# Patient Record
Sex: FEMALE | Race: WHITE | NOT HISPANIC OR LATINO | URBAN - METROPOLITAN AREA
[De-identification: names, ages, dates, MRNs, and addresses within clinical notes are randomized per-mention and may not be internally consistent; named-entity substitution may affect disease eponyms.]

---

## 2017-03-29 ENCOUNTER — EMERGENCY (EMERGENCY)
Facility: HOSPITAL | Age: 82
LOS: 1 days | Discharge: HOME | End: 2017-03-29
Attending: EMERGENCY MEDICINE
Payer: MEDICARE

## 2017-03-29 VITALS
WEIGHT: 128.09 LBS | RESPIRATION RATE: 18 BRPM | HEIGHT: 60 IN | DIASTOLIC BLOOD PRESSURE: 60 MMHG | OXYGEN SATURATION: 94 % | TEMPERATURE: 98 F | HEART RATE: 78 BPM | SYSTOLIC BLOOD PRESSURE: 120 MMHG

## 2017-03-29 VITALS
OXYGEN SATURATION: 95 % | DIASTOLIC BLOOD PRESSURE: 86 MMHG | TEMPERATURE: 99 F | SYSTOLIC BLOOD PRESSURE: 136 MMHG | HEART RATE: 99 BPM | RESPIRATION RATE: 18 BRPM

## 2017-03-29 DIAGNOSIS — K92.0 HEMATEMESIS: ICD-10-CM

## 2017-03-29 DIAGNOSIS — J44.9 CHRONIC OBSTRUCTIVE PULMONARY DISEASE, UNSPECIFIED: ICD-10-CM

## 2017-03-29 DIAGNOSIS — E78.5 HYPERLIPIDEMIA, UNSPECIFIED: ICD-10-CM

## 2017-03-29 DIAGNOSIS — K92.2 GASTROINTESTINAL HEMORRHAGE, UNSPECIFIED: ICD-10-CM

## 2017-03-29 DIAGNOSIS — E03.9 HYPOTHYROIDISM, UNSPECIFIED: ICD-10-CM

## 2017-03-29 LAB
ABO RH CONFIRMATION: SIGNIFICANT CHANGE UP
ALBUMIN SERPL ELPH-MCNC: 3.6 G/DL — SIGNIFICANT CHANGE UP (ref 3.3–5.2)
ALP SERPL-CCNC: 102 U/L — SIGNIFICANT CHANGE UP (ref 40–120)
ALT FLD-CCNC: 14 U/L — SIGNIFICANT CHANGE UP
ANION GAP SERPL CALC-SCNC: 13 MMOL/L — SIGNIFICANT CHANGE UP (ref 5–17)
APTT BLD: 27.9 SEC — SIGNIFICANT CHANGE UP (ref 27.5–37.4)
AST SERPL-CCNC: 17 U/L — SIGNIFICANT CHANGE UP
BASOPHILS # BLD AUTO: 0 K/UL — SIGNIFICANT CHANGE UP (ref 0–0.2)
BASOPHILS NFR BLD AUTO: 0.4 % — SIGNIFICANT CHANGE UP (ref 0–2)
BILIRUB SERPL-MCNC: 0.3 MG/DL — LOW (ref 0.4–2)
BLD GP AB SCN SERPL QL: SIGNIFICANT CHANGE UP
BUN SERPL-MCNC: 18 MG/DL — SIGNIFICANT CHANGE UP (ref 8–20)
CALCIUM SERPL-MCNC: 9.7 MG/DL — SIGNIFICANT CHANGE UP (ref 8.6–10.2)
CHLORIDE SERPL-SCNC: 98 MMOL/L — SIGNIFICANT CHANGE UP (ref 98–107)
CO2 SERPL-SCNC: 30 MMOL/L — HIGH (ref 22–29)
CREAT SERPL-MCNC: 0.7 MG/DL — SIGNIFICANT CHANGE UP (ref 0.5–1.3)
EOSINOPHIL # BLD AUTO: 0.2 K/UL — SIGNIFICANT CHANGE UP (ref 0–0.5)
EOSINOPHIL NFR BLD AUTO: 2.8 % — SIGNIFICANT CHANGE UP (ref 0–6)
GLUCOSE SERPL-MCNC: 96 MG/DL — SIGNIFICANT CHANGE UP (ref 70–115)
HCT VFR BLD CALC: 40 % — SIGNIFICANT CHANGE UP (ref 37–47)
HGB BLD-MCNC: 13.3 G/DL — SIGNIFICANT CHANGE UP (ref 12–16)
INR BLD: 1.01 RATIO — SIGNIFICANT CHANGE UP (ref 0.88–1.16)
LIDOCAIN IGE QN: 41 U/L — SIGNIFICANT CHANGE UP (ref 22–51)
LYMPHOCYTES # BLD AUTO: 1.7 K/UL — SIGNIFICANT CHANGE UP (ref 1–4.8)
LYMPHOCYTES # BLD AUTO: 24.4 % — SIGNIFICANT CHANGE UP (ref 20–55)
MAGNESIUM SERPL-MCNC: 2.2 MG/DL — SIGNIFICANT CHANGE UP (ref 1.8–2.5)
MCHC RBC-ENTMCNC: 30.4 PG — SIGNIFICANT CHANGE UP (ref 27–31)
MCHC RBC-ENTMCNC: 33.3 G/DL — SIGNIFICANT CHANGE UP (ref 32–36)
MCV RBC AUTO: 91.5 FL — SIGNIFICANT CHANGE UP (ref 81–99)
MONOCYTES # BLD AUTO: 0.7 K/UL — SIGNIFICANT CHANGE UP (ref 0–0.8)
MONOCYTES NFR BLD AUTO: 10.1 % — HIGH (ref 3–10)
NEUTROPHILS # BLD AUTO: 4.4 K/UL — SIGNIFICANT CHANGE UP (ref 1.8–8)
NEUTROPHILS NFR BLD AUTO: 62.2 % — SIGNIFICANT CHANGE UP (ref 37–73)
PHOSPHATE SERPL-MCNC: 3.2 MG/DL — SIGNIFICANT CHANGE UP (ref 2.4–4.7)
PLATELET # BLD AUTO: 313 K/UL — SIGNIFICANT CHANGE UP (ref 150–400)
POTASSIUM SERPL-MCNC: 4.1 MMOL/L — SIGNIFICANT CHANGE UP (ref 3.5–5.3)
POTASSIUM SERPL-SCNC: 4.1 MMOL/L — SIGNIFICANT CHANGE UP (ref 3.5–5.3)
PROT SERPL-MCNC: 7.3 G/DL — SIGNIFICANT CHANGE UP (ref 6.6–8.7)
PROTHROM AB SERPL-ACNC: 11.1 SEC — SIGNIFICANT CHANGE UP (ref 9.8–12.7)
RBC # BLD: 4.37 M/UL — LOW (ref 4.4–5.2)
RBC # FLD: 14 % — SIGNIFICANT CHANGE UP (ref 11–15.6)
SODIUM SERPL-SCNC: 141 MMOL/L — SIGNIFICANT CHANGE UP (ref 135–145)
TYPE + AB SCN PNL BLD: SIGNIFICANT CHANGE UP
WBC # BLD: 7 K/UL — SIGNIFICANT CHANGE UP (ref 4.8–10.8)
WBC # FLD AUTO: 7 K/UL — SIGNIFICANT CHANGE UP (ref 4.8–10.8)

## 2017-03-29 PROCEDURE — 83735 ASSAY OF MAGNESIUM: CPT

## 2017-03-29 PROCEDURE — 36415 COLL VENOUS BLD VENIPUNCTURE: CPT

## 2017-03-29 PROCEDURE — 85730 THROMBOPLASTIN TIME PARTIAL: CPT

## 2017-03-29 PROCEDURE — 85610 PROTHROMBIN TIME: CPT

## 2017-03-29 PROCEDURE — 99283 EMERGENCY DEPT VISIT LOW MDM: CPT

## 2017-03-29 PROCEDURE — 86901 BLOOD TYPING SEROLOGIC RH(D): CPT

## 2017-03-29 PROCEDURE — 83690 ASSAY OF LIPASE: CPT

## 2017-03-29 PROCEDURE — 86850 RBC ANTIBODY SCREEN: CPT

## 2017-03-29 PROCEDURE — 86900 BLOOD TYPING SEROLOGIC ABO: CPT

## 2017-03-29 PROCEDURE — 80053 COMPREHEN METABOLIC PANEL: CPT

## 2017-03-29 PROCEDURE — 84100 ASSAY OF PHOSPHORUS: CPT

## 2017-03-29 PROCEDURE — 85027 COMPLETE CBC AUTOMATED: CPT

## 2017-03-29 NOTE — ED ADULT NURSE NOTE - CHIEF COMPLAINT QUOTE
pt alert and awake, normal baseline as per ems, pt is sent from [facility] for r/o upper GI bleed, no signs of vomiting.

## 2017-03-29 NOTE — ED ADULT TRIAGE NOTE - CHIEF COMPLAINT QUOTE
pt alert and awake, normal baseline as per ems, pt is sent from [facility] for r/o upper GI bleed, no signs of vomiting.

## 2017-03-29 NOTE — ED ADULT NURSE NOTE - PMH
Anemia    COPD (chronic obstructive pulmonary disease)    Dementia    Hyperlipemia    Hypothyroidism (acquired)

## 2017-03-29 NOTE — ED ADULT NURSE NOTE - OBJECTIVE STATEMENT
89 year old female awake, confused sent from [facility] to r/o GI bleed. pt. poor historian. will continue to monitor. pending dispo.

## 2017-03-31 NOTE — ED PROVIDER NOTE - MEDICAL DECISION MAKING DETAILS
long talk with POA daughter and son at bedside they are in agreemtn with dementia of patient stable hemoglobin no active bleeding they are not going to pursue and admission forworkup because they never want her to get surgery they understand the risk beenfit of not pursuing where slight bleeding is coming from [facility] notified family all in agreemtn with plabn of care for d.c back to [facility]

## 2017-03-31 NOTE — ED PROVIDER NOTE - OBJECTIVE STATEMENT
pt found with blood next to herself at [facility] she has dementia and denies any fall or vomiting blood. she is at abselin per family at bedside. denies fever. denies HA or neck pain. no chest pain or sob. no abd pain. no n/v/d. no urinary f/u/d. no back pain. no motor or sensory deficits. denies drug use. no recent travel. no rash. no other acute issues symptoms or concerns

## 2017-05-13 ENCOUNTER — EMERGENCY (EMERGENCY)
Facility: HOSPITAL | Age: 82
LOS: 1 days | Discharge: HOME | End: 2017-05-13
Attending: EMERGENCY MEDICINE | Admitting: EMERGENCY MEDICINE
Payer: MEDICARE

## 2017-05-13 VITALS
OXYGEN SATURATION: 92 % | SYSTOLIC BLOOD PRESSURE: 112 MMHG | DIASTOLIC BLOOD PRESSURE: 62 MMHG | HEART RATE: 81 BPM | RESPIRATION RATE: 18 BRPM | TEMPERATURE: 98 F

## 2017-05-13 VITALS
WEIGHT: 130.07 LBS | SYSTOLIC BLOOD PRESSURE: 126 MMHG | RESPIRATION RATE: 18 BRPM | TEMPERATURE: 98 F | DIASTOLIC BLOOD PRESSURE: 80 MMHG | HEART RATE: 79 BPM | OXYGEN SATURATION: 94 % | HEIGHT: 60 IN

## 2017-05-13 DIAGNOSIS — E78.5 HYPERLIPIDEMIA, UNSPECIFIED: ICD-10-CM

## 2017-05-13 DIAGNOSIS — S01.91XA LACERATION WITHOUT FOREIGN BODY OF UNSPECIFIED PART OF HEAD, INITIAL ENCOUNTER: ICD-10-CM

## 2017-05-13 DIAGNOSIS — F03.90 UNSPECIFIED DEMENTIA, UNSPECIFIED SEVERITY, WITHOUT BEHAVIORAL DISTURBANCE, PSYCHOTIC DISTURBANCE, MOOD DISTURBANCE, AND ANXIETY: ICD-10-CM

## 2017-05-13 DIAGNOSIS — E03.9 HYPOTHYROIDISM, UNSPECIFIED: ICD-10-CM

## 2017-05-13 DIAGNOSIS — Z23 ENCOUNTER FOR IMMUNIZATION: ICD-10-CM

## 2017-05-13 DIAGNOSIS — J44.9 CHRONIC OBSTRUCTIVE PULMONARY DISEASE, UNSPECIFIED: ICD-10-CM
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
## 2017-05-13 DIAGNOSIS — S00.03XA CONTUSION OF SCALP, INITIAL ENCOUNTER: ICD-10-CM
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
## 2017-05-13 DIAGNOSIS — Z79.899 OTHER LONG TERM (CURRENT) DRUG THERAPY: ICD-10-CM

## 2017-05-13 LAB
ALBUMIN SERPL ELPH-MCNC: 3.5 G/DL — SIGNIFICANT CHANGE UP (ref 3.3–5.2)
ALP SERPL-CCNC: 109 U/L — SIGNIFICANT CHANGE UP (ref 40–120)
ALT FLD-CCNC: 11 U/L — SIGNIFICANT CHANGE UP
ANION GAP SERPL CALC-SCNC: 13 MMOL/L — SIGNIFICANT CHANGE UP (ref 5–17)
APTT BLD: 29 SEC — SIGNIFICANT CHANGE UP (ref 27.5–37.4)
AST SERPL-CCNC: 16 U/L — SIGNIFICANT CHANGE UP
BASOPHILS # BLD AUTO: 0 K/UL — SIGNIFICANT CHANGE UP (ref 0–0.2)
BASOPHILS NFR BLD AUTO: 0.6 % — SIGNIFICANT CHANGE UP (ref 0–2)
BILIRUB SERPL-MCNC: 0.3 MG/DL — LOW (ref 0.4–2)
BUN SERPL-MCNC: 14 MG/DL — SIGNIFICANT CHANGE UP (ref 8–20)
CALCIUM SERPL-MCNC: 9.2 MG/DL — SIGNIFICANT CHANGE UP (ref 8.6–10.2)
CHLORIDE SERPL-SCNC: 97 MMOL/L — LOW (ref 98–107)
CO2 SERPL-SCNC: 29 MMOL/L — SIGNIFICANT CHANGE UP (ref 22–29)
CREAT SERPL-MCNC: 0.77 MG/DL — SIGNIFICANT CHANGE UP (ref 0.5–1.3)
EOSINOPHIL # BLD AUTO: 0.2 K/UL — SIGNIFICANT CHANGE UP (ref 0–0.5)
EOSINOPHIL NFR BLD AUTO: 2.7 % — SIGNIFICANT CHANGE UP (ref 0–6)
GLUCOSE SERPL-MCNC: 89 MG/DL — SIGNIFICANT CHANGE UP (ref 70–115)
HCT VFR BLD CALC: 39.7 % — SIGNIFICANT CHANGE UP (ref 37–47)
HGB BLD-MCNC: 12.9 G/DL — SIGNIFICANT CHANGE UP (ref 12–16)
INR BLD: 1.02 RATIO — SIGNIFICANT CHANGE UP (ref 0.88–1.16)
LYMPHOCYTES # BLD AUTO: 1.6 K/UL — SIGNIFICANT CHANGE UP (ref 1–4.8)
LYMPHOCYTES # BLD AUTO: 19.8 % — LOW (ref 20–55)
MCHC RBC-ENTMCNC: 30.4 PG — SIGNIFICANT CHANGE UP (ref 27–31)
MCHC RBC-ENTMCNC: 32.5 G/DL — SIGNIFICANT CHANGE UP (ref 32–36)
MCV RBC AUTO: 93.4 FL — SIGNIFICANT CHANGE UP (ref 81–99)
MONOCYTES # BLD AUTO: 0.9 K/UL — HIGH (ref 0–0.8)
MONOCYTES NFR BLD AUTO: 10.9 % — HIGH (ref 3–10)
NEUTROPHILS # BLD AUTO: 5.2 K/UL — SIGNIFICANT CHANGE UP (ref 1.8–8)
NEUTROPHILS NFR BLD AUTO: 65.9 % — SIGNIFICANT CHANGE UP (ref 37–73)
NT-PROBNP SERPL-SCNC: 26 PG/ML — SIGNIFICANT CHANGE UP (ref 0–300)
PLATELET # BLD AUTO: 313 K/UL — SIGNIFICANT CHANGE UP (ref 150–400)
POTASSIUM SERPL-MCNC: 3.8 MMOL/L — SIGNIFICANT CHANGE UP (ref 3.5–5.3)
POTASSIUM SERPL-SCNC: 3.8 MMOL/L — SIGNIFICANT CHANGE UP (ref 3.5–5.3)
PROT SERPL-MCNC: 7.1 G/DL — SIGNIFICANT CHANGE UP (ref 6.6–8.7)
PROTHROM AB SERPL-ACNC: 11.2 SEC — SIGNIFICANT CHANGE UP (ref 9.8–12.7)
RBC # BLD: 4.25 M/UL — LOW (ref 4.4–5.2)
RBC # FLD: 13.9 % — SIGNIFICANT CHANGE UP (ref 11–15.6)
SODIUM SERPL-SCNC: 139 MMOL/L — SIGNIFICANT CHANGE UP (ref 135–145)
TROPONIN T SERPL-MCNC: <0.01 NG/ML — SIGNIFICANT CHANGE UP (ref 0–0.06)
WBC # BLD: 7.9 K/UL — SIGNIFICANT CHANGE UP (ref 4.8–10.8)
WBC # FLD AUTO: 7.9 K/UL — SIGNIFICANT CHANGE UP (ref 4.8–10.8)

## 2017-05-13 PROCEDURE — 90715 TDAP VACCINE 7 YRS/> IM: CPT

## 2017-05-13 PROCEDURE — 83880 ASSAY OF NATRIURETIC PEPTIDE: CPT

## 2017-05-13 PROCEDURE — 85027 COMPLETE CBC AUTOMATED: CPT

## 2017-05-13 PROCEDURE — 85730 THROMBOPLASTIN TIME PARTIAL: CPT

## 2017-05-13 PROCEDURE — 99284 EMERGENCY DEPT VISIT MOD MDM: CPT

## 2017-05-13 PROCEDURE — 90471 IMMUNIZATION ADMIN: CPT

## 2017-05-13 PROCEDURE — 70450 CT HEAD/BRAIN W/O DYE: CPT

## 2017-05-13 PROCEDURE — 70450 CT HEAD/BRAIN W/O DYE: CPT | Mod: 26

## 2017-05-13 PROCEDURE — 84484 ASSAY OF TROPONIN QUANT: CPT

## 2017-05-13 PROCEDURE — 99284 EMERGENCY DEPT VISIT MOD MDM: CPT | Mod: 25

## 2017-05-13 PROCEDURE — 85610 PROTHROMBIN TIME: CPT

## 2017-05-13 PROCEDURE — 80053 COMPREHEN METABOLIC PANEL: CPT

## 2017-05-13 RX ORDER — TETANUS TOXOID, REDUCED DIPHTHERIA TOXOID AND ACELLULAR PERTUSSIS VACCINE, ADSORBED 5; 2.5; 8; 8; 2.5 [IU]/.5ML; [IU]/.5ML; UG/.5ML; UG/.5ML; UG/.5ML
0.5 SUSPENSION INTRAMUSCULAR ONCE
Qty: 0 | Refills: 0 | Status: COMPLETED | OUTPATIENT
Start: 2017-05-13 | End: 2017-05-13

## 2017-05-13 RX ADMIN — TETANUS TOXOID, REDUCED DIPHTHERIA TOXOID AND ACELLULAR PERTUSSIS VACCINE, ADSORBED 0.5 MILLILITER(S): 5; 2.5; 8; 8; 2.5 SUSPENSION INTRAMUSCULAR at 12:31

## 2017-05-13 NOTE — ED ADULT TRIAGE NOTE - CHIEF COMPLAINT QUOTE
pt alert and awake, normal baseline as per ems, BIBA from [facility] from unwitnessed fall, abrasion behind right ear with dry blood, denies blood thinner use.

## 2017-05-13 NOTE — ED PROVIDER NOTE - HEAD, MLM
Small occipital hematoma with lac seen. No active bleeding,  Head shape is symmetrical. Small occipital hematoma with abrasion seen. No active bleeding,  Head shape is symmetrical.

## 2017-05-13 NOTE — ED PROVIDER NOTE - MEDICAL DECISION MAKING DETAILS
The patient appears well and feels better and wishes to go back to [facility]. Lab and CT negative. Will DC and follow up with PMD

## 2017-05-13 NOTE — ED ADULT NURSE NOTE - OBJECTIVE STATEMENT
89 yo female found by staff on floor with dried blood noted to right occipital area behind the right ear.  Patient poor historian due to baseline dementia.  Abrasion noted. Denies any complaints.

## 2017-05-13 NOTE — ED PROVIDER NOTE - OBJECTIVE STATEMENT
The patient is a 89 year old female presents with un-witnessed fall. There is small laceration on the behind the right ear.  The patient has history of dementia.

## 2017-05-13 NOTE — ED PROVIDER NOTE - SKIN NEGATIVE STATEMENT, MLM
no jaundice, no lesions, no pruritis, and no rashes, small laceration seen on occiput behind right ear

## 2017-07-12 ENCOUNTER — EMERGENCY (EMERGENCY)
Facility: HOSPITAL | Age: 82
LOS: 1 days | Discharge: HOME | End: 2017-07-12
Attending: EMERGENCY MEDICINE
Payer: MEDICARE

## 2017-07-12 VITALS
OXYGEN SATURATION: 94 % | RESPIRATION RATE: 20 BRPM | DIASTOLIC BLOOD PRESSURE: 87 MMHG | SYSTOLIC BLOOD PRESSURE: 148 MMHG | TEMPERATURE: 98 F | HEART RATE: 78 BPM

## 2017-07-12 VITALS
HEART RATE: 88 BPM | TEMPERATURE: 98 F | DIASTOLIC BLOOD PRESSURE: 74 MMHG | HEIGHT: 65 IN | WEIGHT: 149.91 LBS | OXYGEN SATURATION: 99 % | SYSTOLIC BLOOD PRESSURE: 128 MMHG | RESPIRATION RATE: 16 BRPM

## 2017-07-12 DIAGNOSIS — R41.82 ALTERED MENTAL STATUS, UNSPECIFIED: ICD-10-CM

## 2017-07-12 DIAGNOSIS — Z79.899 OTHER LONG TERM (CURRENT) DRUG THERAPY: ICD-10-CM

## 2017-07-12 DIAGNOSIS — N39.0 URINARY TRACT INFECTION, SITE NOT SPECIFIED: ICD-10-CM

## 2017-07-12 DIAGNOSIS — E03.9 HYPOTHYROIDISM, UNSPECIFIED: ICD-10-CM

## 2017-07-12 DIAGNOSIS — E78.5 HYPERLIPIDEMIA, UNSPECIFIED: ICD-10-CM

## 2017-07-12 DIAGNOSIS — J44.9 CHRONIC OBSTRUCTIVE PULMONARY DISEASE, UNSPECIFIED: ICD-10-CM

## 2017-07-12 LAB
ALBUMIN SERPL ELPH-MCNC: 3.9 G/DL — SIGNIFICANT CHANGE UP (ref 3.3–5.2)
ALP SERPL-CCNC: 115 U/L — SIGNIFICANT CHANGE UP (ref 40–120)
ALT FLD-CCNC: 14 U/L — SIGNIFICANT CHANGE UP
ANION GAP SERPL CALC-SCNC: 15 MMOL/L — SIGNIFICANT CHANGE UP (ref 5–17)
APAP SERPL-MCNC: <7.5 UG/ML — LOW (ref 10–26)
APPEARANCE UR: CLEAR — SIGNIFICANT CHANGE UP
APTT BLD: 26.2 SEC — LOW (ref 27.5–37.4)
AST SERPL-CCNC: 25 U/L — SIGNIFICANT CHANGE UP
BASOPHILS # BLD AUTO: 0 K/UL — SIGNIFICANT CHANGE UP (ref 0–0.2)
BASOPHILS NFR BLD AUTO: 0.7 % — SIGNIFICANT CHANGE UP (ref 0–2)
BILIRUB SERPL-MCNC: 0.4 MG/DL — SIGNIFICANT CHANGE UP (ref 0.4–2)
BILIRUB UR-MCNC: NEGATIVE — SIGNIFICANT CHANGE UP
BUN SERPL-MCNC: 13 MG/DL — SIGNIFICANT CHANGE UP (ref 8–20)
CALCIUM SERPL-MCNC: 9.3 MG/DL — SIGNIFICANT CHANGE UP (ref 8.6–10.2)
CHLORIDE SERPL-SCNC: 102 MMOL/L — SIGNIFICANT CHANGE UP (ref 98–107)
CO2 SERPL-SCNC: 26 MMOL/L — SIGNIFICANT CHANGE UP (ref 22–29)
COLOR SPEC: YELLOW — SIGNIFICANT CHANGE UP
CREAT SERPL-MCNC: 0.73 MG/DL — SIGNIFICANT CHANGE UP (ref 0.5–1.3)
DIFF PNL FLD: NEGATIVE — SIGNIFICANT CHANGE UP
EOSINOPHIL # BLD AUTO: 0.2 K/UL — SIGNIFICANT CHANGE UP (ref 0–0.5)
EOSINOPHIL NFR BLD AUTO: 2.9 % — SIGNIFICANT CHANGE UP (ref 0–6)
EPI CELLS # UR: SIGNIFICANT CHANGE UP
GLUCOSE SERPL-MCNC: 92 MG/DL — SIGNIFICANT CHANGE UP (ref 70–115)
GLUCOSE UR QL: NEGATIVE MG/DL — SIGNIFICANT CHANGE UP
HCT VFR BLD CALC: 42.5 % — SIGNIFICANT CHANGE UP (ref 37–47)
HGB BLD-MCNC: 14.1 G/DL — SIGNIFICANT CHANGE UP (ref 12–16)
INR BLD: 0.98 RATIO — SIGNIFICANT CHANGE UP (ref 0.88–1.16)
KETONES UR-MCNC: NEGATIVE — SIGNIFICANT CHANGE UP
LEUKOCYTE ESTERASE UR-ACNC: ABNORMAL
LYMPHOCYTES # BLD AUTO: 1.8 K/UL — SIGNIFICANT CHANGE UP (ref 1–4.8)
LYMPHOCYTES # BLD AUTO: 25.7 % — SIGNIFICANT CHANGE UP (ref 20–55)
MCHC RBC-ENTMCNC: 30.5 PG — SIGNIFICANT CHANGE UP (ref 27–31)
MCHC RBC-ENTMCNC: 33.2 G/DL — SIGNIFICANT CHANGE UP (ref 32–36)
MCV RBC AUTO: 92 FL — SIGNIFICANT CHANGE UP (ref 81–99)
MONOCYTES # BLD AUTO: 0.6 K/UL — SIGNIFICANT CHANGE UP (ref 0–0.8)
MONOCYTES NFR BLD AUTO: 9 % — SIGNIFICANT CHANGE UP (ref 3–10)
NEUTROPHILS # BLD AUTO: 4.3 K/UL — SIGNIFICANT CHANGE UP (ref 1.8–8)
NEUTROPHILS NFR BLD AUTO: 61.6 % — SIGNIFICANT CHANGE UP (ref 37–73)
NITRITE UR-MCNC: NEGATIVE — SIGNIFICANT CHANGE UP
PH UR: 8 — SIGNIFICANT CHANGE UP (ref 5–8)
PLATELET # BLD AUTO: 311 K/UL — SIGNIFICANT CHANGE UP (ref 150–400)
POTASSIUM SERPL-MCNC: 4.8 MMOL/L — SIGNIFICANT CHANGE UP (ref 3.5–5.3)
POTASSIUM SERPL-SCNC: 4.8 MMOL/L — SIGNIFICANT CHANGE UP (ref 3.5–5.3)
PROT SERPL-MCNC: 7.6 G/DL — SIGNIFICANT CHANGE UP (ref 6.6–8.7)
PROT UR-MCNC: NEGATIVE MG/DL — SIGNIFICANT CHANGE UP
PROTHROM AB SERPL-ACNC: 10.8 SEC — SIGNIFICANT CHANGE UP (ref 9.8–12.7)
RBC # BLD: 4.62 M/UL — SIGNIFICANT CHANGE UP (ref 4.4–5.2)
RBC # FLD: 13.4 % — SIGNIFICANT CHANGE UP (ref 11–15.6)
SODIUM SERPL-SCNC: 143 MMOL/L — SIGNIFICANT CHANGE UP (ref 135–145)
SP GR SPEC: 1.01 — SIGNIFICANT CHANGE UP (ref 1.01–1.02)
TSH SERPL-MCNC: 0.02 UIU/ML — LOW (ref 0.27–4.2)
UROBILINOGEN FLD QL: NEGATIVE MG/DL — SIGNIFICANT CHANGE UP
WBC # BLD: 7 K/UL — SIGNIFICANT CHANGE UP (ref 4.8–10.8)
WBC # FLD AUTO: 7 K/UL — SIGNIFICANT CHANGE UP (ref 4.8–10.8)
WBC UR QL: SIGNIFICANT CHANGE UP

## 2017-07-12 PROCEDURE — 80307 DRUG TEST PRSMV CHEM ANLYZR: CPT

## 2017-07-12 PROCEDURE — 70450 CT HEAD/BRAIN W/O DYE: CPT | Mod: 26

## 2017-07-12 PROCEDURE — 81001 URINALYSIS AUTO W/SCOPE: CPT

## 2017-07-12 PROCEDURE — 93005 ELECTROCARDIOGRAM TRACING: CPT

## 2017-07-12 PROCEDURE — 87086 URINE CULTURE/COLONY COUNT: CPT

## 2017-07-12 PROCEDURE — 36415 COLL VENOUS BLD VENIPUNCTURE: CPT

## 2017-07-12 PROCEDURE — 70450 CT HEAD/BRAIN W/O DYE: CPT

## 2017-07-12 PROCEDURE — 93010 ELECTROCARDIOGRAM REPORT: CPT

## 2017-07-12 PROCEDURE — 71046 X-RAY EXAM CHEST 2 VIEWS: CPT

## 2017-07-12 PROCEDURE — 71020: CPT | Mod: 26

## 2017-07-12 PROCEDURE — 84443 ASSAY THYROID STIM HORMONE: CPT

## 2017-07-12 PROCEDURE — 85027 COMPLETE CBC AUTOMATED: CPT

## 2017-07-12 PROCEDURE — 85730 THROMBOPLASTIN TIME PARTIAL: CPT

## 2017-07-12 PROCEDURE — 80053 COMPREHEN METABOLIC PANEL: CPT

## 2017-07-12 PROCEDURE — 99284 EMERGENCY DEPT VISIT MOD MDM: CPT

## 2017-07-12 PROCEDURE — 85610 PROTHROMBIN TIME: CPT

## 2017-07-12 PROCEDURE — 99284 EMERGENCY DEPT VISIT MOD MDM: CPT | Mod: 25

## 2017-07-12 RX ORDER — CEPHALEXIN 500 MG
1 CAPSULE ORAL
Qty: 40 | Refills: 0 | OUTPATIENT
Start: 2017-07-12 | End: 2017-07-22

## 2017-07-12 RX ORDER — CEPHALEXIN 500 MG
500 CAPSULE ORAL
Qty: 0 | Refills: 0 | Status: DISCONTINUED | OUTPATIENT
Start: 2017-07-12 | End: 2017-07-16

## 2017-07-12 RX ADMIN — Medication 500 MILLIGRAM(S): at 13:13

## 2017-07-12 NOTE — ED PROVIDER NOTE - OBJECTIVE STATEMENT
89 year old female with a h/o dementia , COPD, HLD and anemia presents with altered mental status. Pt currently lives in the [facility] and was noted today to be very difficult to arouse. pt is currently yelling at her son to go home

## 2017-07-12 NOTE — ED PROVIDER NOTE - PROGRESS NOTE DETAILS
pt's daughter arrived and ststes that this is her baseline mental status. pt is awake alert and fiesty with a uti to be given antibiotics and discharged

## 2017-07-12 NOTE — ED ADULT TRIAGE NOTE - CHIEF COMPLAINT QUOTE
Sent from [facility] for "unresponsive to verbal and tactile stimuli." EMS states NH staff stated patient took longer than normal to open eyes this morning. Pt with HX dementia. Pt states she does not know why she is here. Pt well appearing without any distress present. Calm and cooperative. Respirations even and unlabored. Pt disoriented to place and time.

## 2017-07-12 NOTE — ED ADULT NURSE NOTE - OBJECTIVE STATEMENT
Sent from [facility] for "unresponsive to verbal and tactile stimuli." EMS states NH staff stated patient took longer than normal to open eyes this morning. Pt with HX dementia. Pt states she does not know why she is here. Pt well appearing without any distress present. irritated yet cooperative, but requesting to "go home" frequently . Respirations even and unlabored. Pt disoriented to place and time son was at bedside but left due to patients irritation with his presence.

## 2017-07-13 LAB
CULTURE RESULTS: SIGNIFICANT CHANGE UP
SPECIMEN SOURCE: SIGNIFICANT CHANGE UP

## 2018-01-22 ENCOUNTER — INPATIENT (INPATIENT)
Facility: HOSPITAL | Age: 83
LOS: 2 days | Discharge: NURSING, REHAB OR LONG-TERM CARE | DRG: 812 | End: 2018-01-25
Attending: HOSPITALIST | Admitting: HOSPITALIST
Payer: MEDICARE

## 2018-01-22 VITALS
WEIGHT: 100.09 LBS | TEMPERATURE: 98 F | OXYGEN SATURATION: 92 % | HEART RATE: 72 BPM | SYSTOLIC BLOOD PRESSURE: 99 MMHG | DIASTOLIC BLOOD PRESSURE: 57 MMHG | HEIGHT: 56 IN | RESPIRATION RATE: 20 BRPM

## 2018-01-22 DIAGNOSIS — D64.9 ANEMIA, UNSPECIFIED: ICD-10-CM

## 2018-01-22 LAB
ALBUMIN SERPL ELPH-MCNC: 3.5 G/DL — SIGNIFICANT CHANGE UP (ref 3.3–5.2)
ALP SERPL-CCNC: 117 U/L — SIGNIFICANT CHANGE UP (ref 40–120)
ALT FLD-CCNC: 11 U/L — SIGNIFICANT CHANGE UP
ANION GAP SERPL CALC-SCNC: 13 MMOL/L — SIGNIFICANT CHANGE UP (ref 5–17)
ANISOCYTOSIS BLD QL: SLIGHT — SIGNIFICANT CHANGE UP
APTT BLD: 23.4 SEC — LOW (ref 27.5–37.4)
AST SERPL-CCNC: 20 U/L — SIGNIFICANT CHANGE UP
BASOPHILS # BLD AUTO: 0 K/UL — SIGNIFICANT CHANGE UP (ref 0–0.2)
BASOPHILS NFR BLD AUTO: 0.8 % — SIGNIFICANT CHANGE UP (ref 0–2)
BILIRUB SERPL-MCNC: 0.4 MG/DL — SIGNIFICANT CHANGE UP (ref 0.4–2)
BLD GP AB SCN SERPL QL: SIGNIFICANT CHANGE UP
BUN SERPL-MCNC: 18 MG/DL — SIGNIFICANT CHANGE UP (ref 8–20)
CALCIUM SERPL-MCNC: 9 MG/DL — SIGNIFICANT CHANGE UP (ref 8.6–10.2)
CHLORIDE SERPL-SCNC: 96 MMOL/L — LOW (ref 98–107)
CK SERPL-CCNC: 43 U/L — SIGNIFICANT CHANGE UP (ref 25–170)
CO2 SERPL-SCNC: 32 MMOL/L — HIGH (ref 22–29)
CREAT SERPL-MCNC: 0.9 MG/DL — SIGNIFICANT CHANGE UP (ref 0.5–1.3)
ELLIPTOCYTES BLD QL SMEAR: SLIGHT — SIGNIFICANT CHANGE UP
EOSINOPHIL # BLD AUTO: 0.1 K/UL — SIGNIFICANT CHANGE UP (ref 0–0.5)
EOSINOPHIL NFR BLD AUTO: 1.6 % — SIGNIFICANT CHANGE UP (ref 0–6)
FERRITIN SERPL-MCNC: 11.6 NG/ML — SIGNIFICANT CHANGE UP (ref 11–306)
FOLATE SERPL-MCNC: 18.9 NG/ML — HIGH (ref 4–16)
GLUCOSE SERPL-MCNC: 139 MG/DL — HIGH (ref 70–115)
HCT VFR BLD CALC: 24 % — LOW (ref 37–47)
HGB BLD-MCNC: 6.8 G/DL — CRITICAL LOW (ref 12–16)
HYPOCHROMIA BLD QL: SLIGHT — SIGNIFICANT CHANGE UP
INR BLD: 1.1 RATIO — SIGNIFICANT CHANGE UP (ref 0.88–1.16)
IRON SATN MFR SERPL: 10 UG/DL — LOW (ref 37–145)
IRON SATN MFR SERPL: 3 % — LOW (ref 14–50)
LYMPHOCYTES # BLD AUTO: 0.7 K/UL — LOW (ref 1–4.8)
LYMPHOCYTES # BLD AUTO: 13.1 % — LOW (ref 20–55)
MCHC RBC-ENTMCNC: 21.6 PG — LOW (ref 27–31)
MCHC RBC-ENTMCNC: 28.3 G/DL — LOW (ref 32–36)
MCV RBC AUTO: 76.2 FL — LOW (ref 81–99)
MICROCYTES BLD QL: SLIGHT — SIGNIFICANT CHANGE UP
MONOCYTES # BLD AUTO: 0.6 K/UL — SIGNIFICANT CHANGE UP (ref 0–0.8)
MONOCYTES NFR BLD AUTO: 11.5 % — HIGH (ref 3–10)
NEUTROPHILS # BLD AUTO: 3.7 K/UL — SIGNIFICANT CHANGE UP (ref 1.8–8)
NEUTROPHILS NFR BLD AUTO: 72.8 % — SIGNIFICANT CHANGE UP (ref 37–73)
OB PNL STL: NEGATIVE — SIGNIFICANT CHANGE UP
OVALOCYTES BLD QL SMEAR: SLIGHT — SIGNIFICANT CHANGE UP
PLAT MORPH BLD: NORMAL — SIGNIFICANT CHANGE UP
PLATELET # BLD AUTO: 494 K/UL — HIGH (ref 150–400)
POIKILOCYTOSIS BLD QL AUTO: SLIGHT — SIGNIFICANT CHANGE UP
POTASSIUM SERPL-MCNC: 2.7 MMOL/L — CRITICAL LOW (ref 3.5–5.3)
POTASSIUM SERPL-SCNC: 2.7 MMOL/L — CRITICAL LOW (ref 3.5–5.3)
PROT SERPL-MCNC: 6.7 G/DL — SIGNIFICANT CHANGE UP (ref 6.6–8.7)
PROTHROM AB SERPL-ACNC: 12.1 SEC — SIGNIFICANT CHANGE UP (ref 9.8–12.7)
RBC # BLD: 2.94 M/UL — LOW (ref 4.4–5.2)
RBC # BLD: 3.15 M/UL — LOW (ref 4.4–5.2)
RBC # FLD: 17.2 % — HIGH (ref 11–15.6)
RBC BLD AUTO: ABNORMAL
RETICS #: 4.4 K/UL — LOW (ref 25–125)
RETICS/RBC NFR: 1.5 % — SIGNIFICANT CHANGE UP (ref 0.5–2.5)
SODIUM SERPL-SCNC: 141 MMOL/L — SIGNIFICANT CHANGE UP (ref 135–145)
TARGETS BLD QL SMEAR: SLIGHT — SIGNIFICANT CHANGE UP
TIBC SERPL-MCNC: 355 UG/DL — SIGNIFICANT CHANGE UP (ref 220–430)
TRANSFERRIN SERPL-MCNC: 248 MG/DL — SIGNIFICANT CHANGE UP (ref 192–382)
TROPONIN T SERPL-MCNC: <0.01 NG/ML — SIGNIFICANT CHANGE UP (ref 0–0.06)
TYPE + AB SCN PNL BLD: SIGNIFICANT CHANGE UP
VIT B12 SERPL-MCNC: 1043 PG/ML — HIGH (ref 180–914)
WBC # BLD: 5 K/UL — SIGNIFICANT CHANGE UP (ref 4.8–10.8)
WBC # FLD AUTO: 5 K/UL — SIGNIFICANT CHANGE UP (ref 4.8–10.8)

## 2018-01-22 PROCEDURE — 99285 EMERGENCY DEPT VISIT HI MDM: CPT

## 2018-01-22 PROCEDURE — 99223 1ST HOSP IP/OBS HIGH 75: CPT | Mod: AI

## 2018-01-22 PROCEDURE — 93010 ELECTROCARDIOGRAM REPORT: CPT

## 2018-01-22 PROCEDURE — 70450 CT HEAD/BRAIN W/O DYE: CPT | Mod: 26

## 2018-01-22 RX ORDER — CITALOPRAM 10 MG/1
10 TABLET, FILM COATED ORAL DAILY
Qty: 0 | Refills: 0 | Status: DISCONTINUED | OUTPATIENT
Start: 2018-01-22 | End: 2018-01-25

## 2018-01-22 RX ORDER — PANTOPRAZOLE SODIUM 20 MG/1
40 TABLET, DELAYED RELEASE ORAL
Qty: 0 | Refills: 0 | Status: DISCONTINUED | OUTPATIENT
Start: 2018-01-22 | End: 2018-01-25

## 2018-01-22 RX ORDER — POTASSIUM CHLORIDE 20 MEQ
10 PACKET (EA) ORAL
Qty: 0 | Refills: 0 | Status: COMPLETED | OUTPATIENT
Start: 2018-01-22 | End: 2018-01-23

## 2018-01-22 RX ORDER — POTASSIUM CHLORIDE 20 MEQ
40 PACKET (EA) ORAL ONCE
Qty: 0 | Refills: 0 | Status: COMPLETED | OUTPATIENT
Start: 2018-01-22 | End: 2018-01-22

## 2018-01-22 RX ORDER — DONEPEZIL HYDROCHLORIDE 10 MG/1
10 TABLET, FILM COATED ORAL AT BEDTIME
Qty: 0 | Refills: 0 | Status: DISCONTINUED | OUTPATIENT
Start: 2018-01-22 | End: 2018-01-25

## 2018-01-22 RX ORDER — POTASSIUM CHLORIDE 20 MEQ
10 PACKET (EA) ORAL
Qty: 0 | Refills: 0 | Status: DISCONTINUED | OUTPATIENT
Start: 2018-01-22 | End: 2018-01-22

## 2018-01-22 RX ORDER — HALOPERIDOL DECANOATE 100 MG/ML
1 INJECTION INTRAMUSCULAR EVERY 8 HOURS
Qty: 0 | Refills: 0 | Status: DISCONTINUED | OUTPATIENT
Start: 2018-01-22 | End: 2018-01-24

## 2018-01-22 RX ORDER — POTASSIUM CHLORIDE 20 MEQ
20 PACKET (EA) ORAL DAILY
Qty: 0 | Refills: 0 | Status: DISCONTINUED | OUTPATIENT
Start: 2018-01-23 | End: 2018-01-25

## 2018-01-22 RX ORDER — SODIUM CHLORIDE 9 MG/ML
1000 INJECTION INTRAMUSCULAR; INTRAVENOUS; SUBCUTANEOUS
Qty: 0 | Refills: 0 | Status: DISCONTINUED | OUTPATIENT
Start: 2018-01-22 | End: 2018-01-24

## 2018-01-22 RX ORDER — RISPERIDONE 4 MG/1
0.12 TABLET ORAL AT BEDTIME
Qty: 0 | Refills: 0 | Status: DISCONTINUED | OUTPATIENT
Start: 2018-01-22 | End: 2018-01-25

## 2018-01-22 RX ORDER — LEVOTHYROXINE SODIUM 125 MCG
125 TABLET ORAL DAILY
Qty: 0 | Refills: 0 | Status: DISCONTINUED | OUTPATIENT
Start: 2018-01-22 | End: 2018-01-25

## 2018-01-22 RX ORDER — CALCIUM CARBONATE 500(1250)
1 TABLET ORAL DAILY
Qty: 0 | Refills: 0 | Status: DISCONTINUED | OUTPATIENT
Start: 2018-01-22 | End: 2018-01-25

## 2018-01-22 RX ADMIN — HALOPERIDOL DECANOATE 1 MILLIGRAM(S): 100 INJECTION INTRAMUSCULAR at 17:33

## 2018-01-22 RX ADMIN — Medication 100 MILLIEQUIVALENT(S): at 20:07

## 2018-01-22 RX ADMIN — Medication 100 MILLIEQUIVALENT(S): at 11:25

## 2018-01-22 RX ADMIN — RISPERIDONE 0.12 MILLIGRAM(S): 4 TABLET ORAL at 22:49

## 2018-01-22 RX ADMIN — Medication 100 MILLIEQUIVALENT(S): at 23:02

## 2018-01-22 RX ADMIN — Medication 100 MILLIEQUIVALENT(S): at 21:54

## 2018-01-22 RX ADMIN — SODIUM CHLORIDE 75 MILLILITER(S): 9 INJECTION INTRAMUSCULAR; INTRAVENOUS; SUBCUTANEOUS at 11:30

## 2018-01-22 RX ADMIN — DONEPEZIL HYDROCHLORIDE 10 MILLIGRAM(S): 10 TABLET, FILM COATED ORAL at 22:49

## 2018-01-22 RX ADMIN — Medication 40 MILLIEQUIVALENT(S): at 14:57

## 2018-01-22 NOTE — ED ADULT NURSE REASSESSMENT NOTE - NS ED NURSE REASSESS COMMENT FT1
Care assumed from off-going RN at this time, charting as noted. Pt is not on CM or  at this time but is on a 1:1 with second PRBC unit infusing, will locate monitor for patient. Second IV to be obtained at this time for pt to receive K+ riders.
Dr Hall admitting MD at bedside
Gilford from hematology made writer aware that PT/PTT canceled because specimen was of "questionable quality". Gilford made RN aware that because hemoglobin is significantly lower than last visit to ED labs need to be redrawn.
LAb will come to redraw PT/INR and PTT
PT completed first IV K+ tolerated well, first unit of PRBCs up and infusing at this time, no incidents, pt having no adverse reaction at this time. Pt has one to one in place and son remains at bedside.
Pt became increasingly agitated, very combative and physically abusive, pt attempting to climb out of stretcher. Attempted to re-orient pt back to stretcher with one to one.  Security also at bedside pt was screaming in ED, Pt was repositioned in bed. Adjeny BIGGS  notified and telephone order obtained for Haldol 1mg IVP Q8hrs prn agitation.
Pt medicated with Haldol as per MD orders
Pt sitting up in stretcher, drinking apple juice, tolerating well.
Spoke with Gem at [facility], pt was sent in for syncope on the toilet. Pt was ambulated to the bathroom with aid and pt had a syncopal episode while on  the toilet witnessed by aid, they called 911 and sent pt here for evaluation.
Lab at bedside to recollect blood

## 2018-01-22 NOTE — H&P ADULT - NSHPLABSRESULTS_GEN_ALL_CORE
LABS:                        6.8    5.0   )-----------( 494      ( 22 Jan 2018 07:46 )             24.0     01-22    141  |  96<L>  |  18.0  ----------------------------<  139<H>  2.7<LL>   |  32.0<H>  |  0.90    Ca    9.0      22 Jan 2018 06:37    TPro  6.7  /  Alb  3.5  /  TBili  0.4  /  DBili  x   /  AST  20  /  ALT  11  /  AlkPhos  117  01-22    PT/INR - ( 22 Jan 2018 08:20 )   PT: 12.1 sec;   INR: 1.10 ratio         PTT - ( 22 Jan 2018 08:20 )  PTT:23.4 sec    LIVER FUNCTIONS - ( 22 Jan 2018 06:37 )  Alb: 3.5 g/dL / Pro: 6.7 g/dL / ALK PHOS: 117 U/L / ALT: 11 U/L / AST: 20 U/L / GGT: x               CARDIAC MARKERS ( 22 Jan 2018 06:37 )  x     / <0.01 ng/mL / 43 U/L / x     / x

## 2018-01-22 NOTE — ED ADULT NURSE NOTE - OBJECTIVE STATEMENT
pt awake and alert, baseline mental status per NH sent to ED s/p witnessed syncopal episode while standing up with staff. staff reports "few seconds of LOC." staff denies pt falling or hitting head. pt denies any pain or discomfort. hx of dementia. pt states she doesn't know why she was sent to ED but she feels fine. pt denies headache, dizziness, lightheadedness, chest pain, SOB, recent fever or chills. pt lying comfortably, will continue to monitor.

## 2018-01-22 NOTE — ED PROVIDER NOTE - OBJECTIVE STATEMENT
89 yo F presents to ED via EMS s/p possible syncopal episode after waking up this AM. Pt with hx of dementia and only oriented x 1.  Pt unable to give any hx.  Hx as per EMS PCR and transfer sheets [facility].  Pt awake and alert at this time and denies any c/o

## 2018-01-22 NOTE — ED ADULT NURSE NOTE - CHIEF COMPLAINT QUOTE
Patient presents to ER from [facility], as per staff patient lost consciousness for a few seconds while trying to get her up, patient is awake and alert, disoriented to place and time, patient with HX of dementia.

## 2018-01-22 NOTE — H&P ADULT - ASSESSMENT
Pt with underlying dementia, unable to provide meaningful history, history obtained from Pt's son Mr Rashad Srivastava at bedside and also from EMR /NH charts. Briefly she is 89 y/o female with PMhx of Chronic anemia, HLD, depression, psychosis, senile dementia, hypothyroidism, NH resident, was sent to ED from NH for possible syncopal episode. Pt's son unable to provide more details of events. Pt is awake she says I am always fine and has no complaints, unable to obtain full review of system due to dementia.  In ED patient noted to have anemia with hgb 6.8 from 14 in July/2017, hypokalemia and CTH negative for acute finding. Transfusion consent obtained fropm son in ED and patient had been started on blood transfusion, FOBT negative in ED. Admitted to medicine for syncope likely from underlying severe anemia.   Off note, per patient son she has no chronic medical problems except dementia but as per NH charts she has history of above chronic medical problems. Son wants no further intervention for her anemia- says give her blood transfusion , stabilize the hemoglobin and sent her back to NH- son refusing for any GI intervention. Pt DNI /DNR per son- MOLST form filled.    Plan: Pt with underlying dementia, unable to provide meaningful history, history obtained from Pt's son Mr Rashad Srivastava at bedside and also from EMR /NH charts. Briefly she is 89 y/o female with PMhx of Chronic anemia, HLD, depression, psychosis, senile dementia, hypothyroidism, NH resident, was sent to ED from NH for possible syncopal episode. Pt's son unable to provide more details of events. Pt is awake she says I am always fine and has no complaints, unable to obtain full review of system due to dementia.  In ED patient noted to have anemia with hgb 6.8 from 14 in July/2017, hypokalemia and CTH negative for acute finding. Transfusion consent obtained fropm son in ED and patient had been started on blood transfusion, FOBT negative in ED. Admitted to medicine for syncope likely from underlying severe anemia.   Off note, per patient son she has no chronic medical problems except dementia but as per NH charts she has history of above chronic medical problems. Son wants no further intervention for her anemia- says give her blood transfusion , stabilize the hemoglobin and sent her back to NH- son refusing for any GI intervention. Pt DNI /DNR per son- MOLST form filled.    Plan:    Acute anemia:  - unclear etiology, occult blood negative in ED.  - C/w IVFS, 2 units pRBCC transfusion. Repeat post-transfusion CBC.  - Pt's son Mr Solorzano refused any aggressive intervention /GI eval, states get her blood transfusion- stabilize hgb and sent her back to NH.    Syncope episode:  - Can be related from severe anemia vs other CNS / CVS etiology.  - Son refused further w/u including TTE /CD.  - Monitor for now    Hypokalemia:  - monitor and replace.    Chronic medical problems as above Hypothyroidism /dementia / psychosis:  - Resume home emds- meds verified from NH charts.  - Keep on 1:1 for safety, fall aspiration precaution    DVT ppx:  - No chemical AC due to anemia,  SCD for now.    Code status:  DNR /DNI, MOLST filled.

## 2018-01-22 NOTE — ED PROVIDER NOTE - CARE PLAN
Principal Discharge DX:	Severe anemia  Secondary Diagnosis:	Hypokalemia  Secondary Diagnosis:	Syncope

## 2018-01-22 NOTE — ED ADULT TRIAGE NOTE - CHIEF COMPLAINT QUOTE
Patient presents to ER from [facility], as per staff patient lost consciousness for a few seconds while trying to get her up, patient is awake and alert, disoriented to place and time, patient with HX of dementia.

## 2018-01-23 LAB
ANION GAP SERPL CALC-SCNC: 11 MMOL/L — SIGNIFICANT CHANGE UP (ref 5–17)
BUN SERPL-MCNC: 10 MG/DL — SIGNIFICANT CHANGE UP (ref 8–20)
CALCIUM SERPL-MCNC: 8.3 MG/DL — LOW (ref 8.6–10.2)
CHLORIDE SERPL-SCNC: 104 MMOL/L — SIGNIFICANT CHANGE UP (ref 98–107)
CO2 SERPL-SCNC: 23 MMOL/L — SIGNIFICANT CHANGE UP (ref 22–29)
CREAT SERPL-MCNC: 0.61 MG/DL — SIGNIFICANT CHANGE UP (ref 0.5–1.3)
GLUCOSE SERPL-MCNC: 88 MG/DL — SIGNIFICANT CHANGE UP (ref 70–115)
HAPTOGLOB SERPL-MCNC: 237 MG/DL — HIGH (ref 34–200)
HCT VFR BLD CALC: 30 % — LOW (ref 37–47)
HGB BLD-MCNC: 9.1 G/DL — LOW (ref 12–16)
MCHC RBC-ENTMCNC: 23.5 PG — LOW (ref 27–31)
MCHC RBC-ENTMCNC: 30.3 G/DL — LOW (ref 32–36)
MCV RBC AUTO: 77.3 FL — LOW (ref 81–99)
PLATELET # BLD AUTO: 461 K/UL — HIGH (ref 150–400)
POTASSIUM SERPL-MCNC: 3.6 MMOL/L — SIGNIFICANT CHANGE UP (ref 3.5–5.3)
POTASSIUM SERPL-SCNC: 3.6 MMOL/L — SIGNIFICANT CHANGE UP (ref 3.5–5.3)
RBC # BLD: 3.88 M/UL — LOW (ref 4.4–5.2)
RBC # FLD: 16.4 % — HIGH (ref 11–15.6)
SODIUM SERPL-SCNC: 138 MMOL/L — SIGNIFICANT CHANGE UP (ref 135–145)
WBC # BLD: 6.2 K/UL — SIGNIFICANT CHANGE UP (ref 4.8–10.8)
WBC # FLD AUTO: 6.2 K/UL — SIGNIFICANT CHANGE UP (ref 4.8–10.8)

## 2018-01-23 PROCEDURE — 99232 SBSQ HOSP IP/OBS MODERATE 35: CPT

## 2018-01-23 RX ORDER — FERROUS SULFATE 325(65) MG
325 TABLET ORAL
Qty: 0 | Refills: 0 | Status: DISCONTINUED | OUTPATIENT
Start: 2018-01-23 | End: 2018-01-25

## 2018-01-23 RX ADMIN — RISPERIDONE 0.12 MILLIGRAM(S): 4 TABLET ORAL at 22:39

## 2018-01-23 RX ADMIN — Medication 1 TABLET(S): at 12:17

## 2018-01-23 RX ADMIN — Medication 325 MILLIGRAM(S): at 17:51

## 2018-01-23 RX ADMIN — Medication 100 MILLIEQUIVALENT(S): at 03:10

## 2018-01-23 RX ADMIN — CITALOPRAM 10 MILLIGRAM(S): 10 TABLET, FILM COATED ORAL at 12:16

## 2018-01-23 RX ADMIN — PANTOPRAZOLE SODIUM 40 MILLIGRAM(S): 20 TABLET, DELAYED RELEASE ORAL at 06:05

## 2018-01-23 RX ADMIN — DONEPEZIL HYDROCHLORIDE 10 MILLIGRAM(S): 10 TABLET, FILM COATED ORAL at 22:39

## 2018-01-23 RX ADMIN — Medication 100 MILLIEQUIVALENT(S): at 01:56

## 2018-01-23 RX ADMIN — Medication 125 MICROGRAM(S): at 06:05

## 2018-01-23 RX ADMIN — SODIUM CHLORIDE 75 MILLILITER(S): 9 INJECTION INTRAMUSCULAR; INTRAVENOUS; SUBCUTANEOUS at 01:57

## 2018-01-23 RX ADMIN — Medication 20 MILLIEQUIVALENT(S): at 12:17

## 2018-01-23 NOTE — PROGRESS NOTE ADULT - ASSESSMENT
Pt with underlying dementia, unable to provide meaningful history, history obtained from Pt's son Mr Rashad Srivastava (HCP) at bedside and also from EMR /NH charts. Briefly she is 89 y/o female with PMhx of Chronic anemia, HLD, depression, psychosis, senile dementia, hypothyroidism, NH resident, was sent to ED from NH for possible syncopal episode. Pt's son unable to provide more details of events. Pt is awake she says I am always fine and has no complaints, unable to obtain full review of system due to dementia.  In ED patient noted to have anemia with hgb 6.8 from 14 in July/2017, hypokalemia and CTH negative for acute finding. Transfusion consent obtained from son in ED and patient had been started on blood transfusion 2 units pRBCs, FOBT negative in ED. Admitted to medicine for syncope likely from underlying severe anemia. Son wants no further intervention for her anemia and no investigation for syncope- as per son give her blood transfusion , stabilize the hemoglobin and sent her back to NH- son refusing for any GI intervention. Pt DNI /DNR per son- MOLST form filled.  Off note, per patient son- she has no chronic medical problems except dementia but as per NH charts she has history of above chronic medical problems.     Plan:    Acute anemia:  - unclear etiology, occult blood negative in ED.  - C/w IVFS, s/p 2 units pRBCC transfusion 1/22. Repeat post-transfusion CBC in process.  - Pt's son Mr Solorzano refused any aggressive intervention /GI eval, states get her blood transfusion- stabilize hgb and sent her back to NH.    Syncope episode:  - Can be related from severe anemia vs other CNS / CVS etiology.  - Son refused further w/u including TTE /CD.  - Monitor for now    Hypokalemia:  - monitor and replace.    Chronic medical problems as above Hypothyroidism /dementia / psychosis:  - Resume home meds- meds verified from NH charts.  - Keep on 1:1 for safety, fall aspiration precaution    DVT ppx:  - No chemical AC due to anemia,  SCD for now.    Code status:  DNR /DNI, MOLST filled.

## 2018-01-23 NOTE — PROGRESS NOTE ADULT - SUBJECTIVE AND OBJECTIVE BOX
ROCK CALVERT Female 89y [id number]    Patient is a 89y old  Female who presents with a chief complaint of Faint (22 Jan 2018 12:18)      Subjective/objective:  Pt seen and examined at bedside, no over night event reported by night staff. Pt with baseline dementia and unable to provide any history. Hemodynamically stable, s/p 2 units pRBCs last night- morning CBC in process.     Review of system:  Unable to obtain due to dementia.       PHYSICAL EXAM:    Vital Signs Last 24 Hrs  T(C): 36.8 (23 Jan 2018 07:39), Max: 37.2 (22 Jan 2018 08:47)  T(F): 98.2 (23 Jan 2018 07:39), Max: 98.9 (22 Jan 2018 08:47)  HR: 76 (23 Jan 2018 07:39) (70 - 91)  BP: 113/65 (23 Jan 2018 07:39) (104/51 - 118/68)  BP(mean): --  RR: 18 (23 Jan 2018 07:39) (18 - 20)  SpO2: 96% (23 Jan 2018 07:39) (92% - 96%)    GENERAL: Pt lying comfortably, NAD.  CHEST/LUNG: Clear to auscultation bilaterally; No wheezing.  HEART: S1S2+, Regular rate and rhythm; No murmurs.  ABDOMEN: Soft, Nontender, Nondistended; Bowel sounds present.  NEURO: AAOX0, moves all 4 extremities.             MEDICATIONS  (STANDING):  calcium carbonate 648 mG 1 Tablet(s) Oral daily  citalopram 10 milliGRAM(s) Oral daily  donepezil 10 milliGRAM(s) Oral at bedtime  ferrous    sulfate 325 milliGRAM(s) Oral two times a day with meals  levothyroxine 125 MICROGram(s) Oral daily  multivitamin 1 Tablet(s) Oral daily  pantoprazole    Tablet 40 milliGRAM(s) Oral before breakfast  potassium chloride    Tablet ER 20 milliEquivalent(s) Oral daily  risperiDONE   Tablet 0.125 milliGRAM(s) Oral at bedtime  sodium chloride 0.9%. 1000 milliLiter(s) (75 mL/Hr) IV Continuous <Continuous>    MEDICATIONS  (PRN):  haloperidol    Injectable 1 milliGRAM(s) IV Push every 8 hours PRN Agitation        Labs:  LABS:                        6.8    5.0   )-----------( 494      ( 22 Jan 2018 07:46 )             24.0     01-22    141  |  96<L>  |  18.0  ----------------------------<  139<H>  2.7<LL>   |  32.0<H>  |  0.90    Ca    9.0      22 Jan 2018 06:37    TPro  6.7  /  Alb  3.5  /  TBili  0.4  /  DBili  x   /  AST  20  /  ALT  11  /  AlkPhos  117  01-22    PT/INR - ( 22 Jan 2018 08:20 )   PT: 12.1 sec;   INR: 1.10 ratio         PTT - ( 22 Jan 2018 08:20 )  PTT:23.4 sec    LIVER FUNCTIONS - ( 22 Jan 2018 06:37 )  Alb: 3.5 g/dL / Pro: 6.7 g/dL / ALK PHOS: 117 U/L / ALT: 11 U/L / AST: 20 U/L / GGT: x               CARDIAC MARKERS ( 22 Jan 2018 06:37 )  x     / <0.01 ng/mL / 43 U/L / x     / x

## 2018-01-24 LAB
HCT VFR BLD CALC: 31.2 % — LOW (ref 37–47)
HGB BLD-MCNC: 9.4 G/DL — LOW (ref 12–16)
MCHC RBC-ENTMCNC: 23.7 PG — LOW (ref 27–31)
MCHC RBC-ENTMCNC: 30.1 G/DL — LOW (ref 32–36)
MCV RBC AUTO: 78.8 FL — LOW (ref 81–99)
PLATELET # BLD AUTO: 455 K/UL — HIGH (ref 150–400)
RBC # BLD: 3.96 M/UL — LOW (ref 4.4–5.2)
RBC # FLD: 17 % — HIGH (ref 11–15.6)
WBC # BLD: 8 K/UL — SIGNIFICANT CHANGE UP (ref 4.8–10.8)
WBC # FLD AUTO: 8 K/UL — SIGNIFICANT CHANGE UP (ref 4.8–10.8)

## 2018-01-24 PROCEDURE — 99232 SBSQ HOSP IP/OBS MODERATE 35: CPT

## 2018-01-24 RX ORDER — HALOPERIDOL DECANOATE 100 MG/ML
1 INJECTION INTRAMUSCULAR EVERY 8 HOURS
Qty: 0 | Refills: 0 | Status: DISCONTINUED | OUTPATIENT
Start: 2018-01-24 | End: 2018-01-25

## 2018-01-24 RX ORDER — HALOPERIDOL DECANOATE 100 MG/ML
2 INJECTION INTRAMUSCULAR ONCE
Qty: 0 | Refills: 0 | Status: DISCONTINUED | OUTPATIENT
Start: 2018-01-24 | End: 2018-01-25

## 2018-01-24 RX ADMIN — RISPERIDONE 0.12 MILLIGRAM(S): 4 TABLET ORAL at 21:48

## 2018-01-24 RX ADMIN — DONEPEZIL HYDROCHLORIDE 10 MILLIGRAM(S): 10 TABLET, FILM COATED ORAL at 21:49

## 2018-01-24 NOTE — PROGRESS NOTE ADULT - ASSESSMENT
Pt with underlying dementia, unable to provide meaningful history, history obtained from Pt's son Mr Rashad Srivastava (HCP) at bedside and also from EMR /NH charts. Briefly she is 89 y/o female with PMhx of Chronic anemia, HLD, depression, psychosis, senile dementia, hypothyroidism, NH resident, was sent to ED from NH for possible syncopal episode. Pt's son unable to provide more details of events. Pt is awake she says I am always fine and has no complaints, unable to obtain full review of system due to dementia.  In ED patient noted to have anemia with hgb 6.8 from 14 in July/2017, hypokalemia and CTH negative for acute finding. Transfusion consent obtained from son in ED and patient had been started on blood transfusion 2 units pRBCs, FOBT negative in ED. Admitted to medicine for syncope likely from underlying severe anemia. Son wants no further intervention for her anemia and no investigation for syncope- as per son give her blood transfusion , stabilize the hemoglobin and sent her back to NH- son refusing for any GI intervention. Pt DNI /DNR per son- MOLST form filled.   Off note, per patient son- she has no chronic medical problems except dementia but as per NH charts she has history of above chronic medical problems.     Plan:    Acute anemia:  - unclear etiology, occult blood negative in ED.  - S/p 2 units pRBCC transfusion 1/22. hgb remains stable.   - Pt's son Mr Solorzano and daughter refused any aggressive intervention /GI eval, states get her blood transfusion- stabilize hgb and sent her back to NH.    Syncope episode:  - Can be related from severe anemia vs other CNS / CVS etiology.  - Son refused further w/u including TTE /CD.  - Monitor for now    Hypokalemia:  - Resolved.     Chronic medical problems as above Hypothyroidism /dementia / psychosis:  - Resume home meds- meds verified from NH charts.  - Keep on 1:1 for safety, fall aspiration precaution    DVT ppx:  - No chemical AC due to anemia,  SCD for now.    Code status:  DNR /DNI, MOLST filled.    Disposition:  - pt on 1:1 for safety- can not return back to facility being on 1:1, d/c 1:1 today and keep on enhanced supervision for safety. D/c tomorrow.

## 2018-01-24 NOTE — PROGRESS NOTE ADULT - SUBJECTIVE AND OBJECTIVE BOX
ROCK CALVERT Female 89y [id number]    Patient is a 89y old  Female who presents with a chief complaint of Faint (22 Jan 2018 12:18)      Subjective/objective:  Pt seen and examined at bedside, overnight she was walking around hallway per NR. Pt with dementia and not able to provide any history. Hemoglobin remains stable. Family does not want any work for syncope or anemia and to send her back to NH. Pt being on 1:1 for safety- can not be return to NH today being on 1:1- d/c 1:1 and keep on enhanced supervision. D/c tomorrow back to facility.     Review of system:  Unable to obtain.      PHYSICAL EXAM:    Vital Signs Last 24 Hrs  T(C): 36.6 (24 Jan 2018 10:16), Max: 36.9 (23 Jan 2018 16:27)  T(F): 97.8 (24 Jan 2018 10:16), Max: 98.5 (23 Jan 2018 16:27)  HR: 88 (24 Jan 2018 10:16) (70 - 119)  BP: 100/68 (24 Jan 2018 10:20) (100/68 - 135/70)  BP(mean): --  RR: 19 (24 Jan 2018 10:16) (17 - 19)  SpO2: 98% (24 Jan 2018 10:16) (98% - 100%)    GENERAL: Pt lying comfortably, NAD.  CHEST/LUNG: Clear to auscultation bilaterally; No wheezing.  HEART: S1S2+, Regular rate and rhythm; No murmurs.  ABDOMEN: Soft, Nontender, Nondistended; Bowel sounds present.  NEURO: AAOX0, moves all 4 extremities.         MEDICATIONS  (STANDING):  calcium carbonate 648 mG 1 Tablet(s) Oral daily  citalopram 10 milliGRAM(s) Oral daily  donepezil 10 milliGRAM(s) Oral at bedtime  ferrous    sulfate 325 milliGRAM(s) Oral two times a day with meals  haloperidol    Injectable 2 milliGRAM(s) IntraMuscular once  levothyroxine 125 MICROGram(s) Oral daily  multivitamin 1 Tablet(s) Oral daily  pantoprazole    Tablet 40 milliGRAM(s) Oral before breakfast  potassium chloride    Tablet ER 20 milliEquivalent(s) Oral daily  risperiDONE   Tablet 0.125 milliGRAM(s) Oral at bedtime  sodium chloride 0.9%. 1000 milliLiter(s) (75 mL/Hr) IV Continuous <Continuous>    MEDICATIONS  (PRN):  haloperidol    Injectable 1 milliGRAM(s) IV Push every 8 hours PRN Agitation        Labs:  LABS:                        9.4    8.0   )-----------( 455      ( 24 Jan 2018 07:21 )             31.2     01-23    138  |  104  |  10.0  ----------------------------<  88  3.6   |  23.0  |  0.61    Ca    8.3<L>      23 Jan 2018 08:09

## 2018-01-25 VITALS
OXYGEN SATURATION: 98 % | HEART RATE: 77 BPM | RESPIRATION RATE: 19 BRPM | DIASTOLIC BLOOD PRESSURE: 77 MMHG | SYSTOLIC BLOOD PRESSURE: 129 MMHG | TEMPERATURE: 98 F

## 2018-01-25 PROCEDURE — 84466 ASSAY OF TRANSFERRIN: CPT

## 2018-01-25 PROCEDURE — 82607 VITAMIN B-12: CPT

## 2018-01-25 PROCEDURE — 86920 COMPATIBILITY TEST SPIN: CPT

## 2018-01-25 PROCEDURE — 36415 COLL VENOUS BLD VENIPUNCTURE: CPT

## 2018-01-25 PROCEDURE — 70450 CT HEAD/BRAIN W/O DYE: CPT

## 2018-01-25 PROCEDURE — 85027 COMPLETE CBC AUTOMATED: CPT

## 2018-01-25 PROCEDURE — P9016: CPT

## 2018-01-25 PROCEDURE — 82746 ASSAY OF FOLIC ACID SERUM: CPT

## 2018-01-25 PROCEDURE — 99239 HOSP IP/OBS DSCHRG MGMT >30: CPT

## 2018-01-25 PROCEDURE — 82272 OCCULT BLD FECES 1-3 TESTS: CPT

## 2018-01-25 PROCEDURE — 36430 TRANSFUSION BLD/BLD COMPNT: CPT

## 2018-01-25 PROCEDURE — 99285 EMERGENCY DEPT VISIT HI MDM: CPT

## 2018-01-25 PROCEDURE — 83010 ASSAY OF HAPTOGLOBIN QUANT: CPT

## 2018-01-25 PROCEDURE — 85610 PROTHROMBIN TIME: CPT

## 2018-01-25 PROCEDURE — 96374 THER/PROPH/DIAG INJ IV PUSH: CPT

## 2018-01-25 PROCEDURE — 85045 AUTOMATED RETICULOCYTE COUNT: CPT

## 2018-01-25 PROCEDURE — 83550 IRON BINDING TEST: CPT

## 2018-01-25 PROCEDURE — 80053 COMPREHEN METABOLIC PANEL: CPT

## 2018-01-25 PROCEDURE — 86901 BLOOD TYPING SEROLOGIC RH(D): CPT

## 2018-01-25 PROCEDURE — 82550 ASSAY OF CK (CPK): CPT

## 2018-01-25 PROCEDURE — 80048 BASIC METABOLIC PNL TOTAL CA: CPT

## 2018-01-25 PROCEDURE — 84484 ASSAY OF TROPONIN QUANT: CPT

## 2018-01-25 PROCEDURE — 93005 ELECTROCARDIOGRAM TRACING: CPT

## 2018-01-25 PROCEDURE — 82728 ASSAY OF FERRITIN: CPT

## 2018-01-25 PROCEDURE — 86900 BLOOD TYPING SEROLOGIC ABO: CPT

## 2018-01-25 PROCEDURE — 85730 THROMBOPLASTIN TIME PARTIAL: CPT

## 2018-01-25 PROCEDURE — 86850 RBC ANTIBODY SCREEN: CPT

## 2018-01-25 RX ORDER — FERROUS SULFATE 325(65) MG
1 TABLET ORAL
Qty: 90 | Refills: 0
Start: 2018-01-25 | End: 2018-02-23

## 2018-01-25 RX ORDER — DOCUSATE SODIUM 100 MG
1 CAPSULE ORAL
Qty: 60 | Refills: 0
Start: 2018-01-25 | End: 2018-02-23

## 2018-01-25 RX ADMIN — Medication 1 TABLET(S): at 11:48

## 2018-01-25 RX ADMIN — Medication 1 TABLET(S): at 11:47

## 2018-01-25 RX ADMIN — Medication 325 MILLIGRAM(S): at 17:29

## 2018-01-25 RX ADMIN — Medication 125 MICROGRAM(S): at 06:26

## 2018-01-25 RX ADMIN — CITALOPRAM 10 MILLIGRAM(S): 10 TABLET, FILM COATED ORAL at 11:47

## 2018-01-25 RX ADMIN — Medication 325 MILLIGRAM(S): at 09:28

## 2018-01-25 RX ADMIN — Medication 20 MILLIEQUIVALENT(S): at 11:47

## 2018-01-25 RX ADMIN — PANTOPRAZOLE SODIUM 40 MILLIGRAM(S): 20 TABLET, DELAYED RELEASE ORAL at 06:26

## 2018-01-25 NOTE — DISCHARGE NOTE ADULT - ADDITIONAL INSTRUCTIONS
Continue with your home medicine which you were taking prior coming to hospital. Iron tab added due to low iron.  Follow up with your PMD

## 2018-01-25 NOTE — DISCHARGE NOTE ADULT - CARE PLAN
Principal Discharge DX:	Anemia, unspecified type  Goal:	Work up not done per family.  Assessment and plan of treatment:	Transfused 2 units pRBCs, hemoglobin remained stable.  Secondary Diagnosis:	Syncope, unspecified syncope type  Goal:	Family refused syncope work up.  Assessment and plan of treatment:	Remained asymptomatic at baseline since admission. Principal Discharge DX:	Anemia, unspecified type  Assessment and plan of treatment:	Transfused 2 units pRBCs, hemoglobin remained stable.  Secondary Diagnosis:	Syncope, unspecified syncope type  Goal:	Family refused syncope work up. Can be related to severe anemia.  Assessment and plan of treatment:	Remained asymptomatic at baseline since admission. Principal Discharge DX:	Anemia, unspecified type  Goal:	Stable.  Assessment and plan of treatment:	Transfused 2 units pRBCs, hemoglobin remained stable.  Secondary Diagnosis:	Syncope, unspecified syncope type  Goal:	Family refused syncope work up. Can be related to severe anemia.  Assessment and plan of treatment:	Remained asymptomatic at baseline since admission.

## 2018-01-25 NOTE — DISCHARGE NOTE ADULT - PATIENT PORTAL LINK FT
“You can access the [facility] Patient Portal, offered by [facility], by registering with the following website: [email or web address]”

## 2018-01-25 NOTE — DISCHARGE NOTE ADULT - PLAN OF CARE
Work up not done per family. Transfused 2 units pRBCs, hemoglobin remained stable. Family refused syncope work up. Remained asymptomatic at baseline since admission. Family refused syncope work up. Can be related to severe anemia. Stable.

## 2018-01-25 NOTE — DISCHARGE NOTE ADULT - MEDICATION SUMMARY - MEDICATIONS TO TAKE
I will START or STAY ON the medications listed below when I get home from the hospital:    Os-Nasim 500 (1250 mg calcium carbonate) oral tablet  --  by mouth once a day  -- Indication: For Supplements    CeleXA 10 mg oral tablet  -- 1 tab(s) by mouth once a day  -- Indication: For Home meds    RisperDAL 0.25 mg oral tablet  -- 0.5 tab(s) by mouth once a day (at bedtime)  -- Indication: For Dementia    Aricept 10 mg oral tablet  -- 1 tab(s) by mouth once a day (at bedtime)  -- Indication: For dementia    Lasix 40 mg oral tablet  -- 1 tab(s) by mouth once a day  -- Indication: For Home meds    FeroSul 325 mg (65 mg elemental iron) oral tablet  -- 1 tab(s) by mouth 3 times a day  -- Indication: For Anemia    Colace 100 mg oral capsule  -- 1 cap(s) by mouth 2 times a day as needed for constipation  -- Medication should be taken with plenty of water.    -- Indication: For On iron supplements- prn constipation.    potassium chloride 20 mEq oral granule, extended release  --  by mouth once a day  -- Indication: For Suplemments    Protonix 40 mg oral delayed release tablet  -- 1 tab(s) by mouth once a day  -- Indication: For GERD    levothyroxine  -- 125 microgram(s) by mouth once a day (in the morning)  -- Indication: For Hypothyroidism    multivitamin  --   once a day  -- Indication: For SUpplements

## 2018-01-25 NOTE — DISCHARGE NOTE ADULT - HOSPITAL COURSE
Pt with underlying dementia, unable to provide meaningful history, history obtained from Pt's son Mr Rashad Srivastava (HCP) at bedside and also from EMR /NH charts. Briefly she is 89 y/o female with PMhx of Chronic anemia, HLD, depression, psychosis, senile dementia, hypothyroidism, from assisted living, was sent to ED from assisted living for possible syncopal episode. Pt's son unable to provide more details of events. Pt is awake she says I am always fine and has no complaints, unable to obtain full review of system due to dementia.  In ED patient noted to have anemia with hgb 6.8 from 14 in July/2017, hypokalemia and CTH negative for acute finding. Transfusion consent obtained from son in ED and patient had been started on blood transfusion 2 units pRBCs, FOBT negative in ED. Admitted to medicine for syncope likely from underlying severe anemia. Son / daughter refused further work up/ blood test / intervention including TTE /CD /MRI /GI evaluation for her anemia and Syncope- as per son /daughter give her blood transfusion , stabilize the hemoglobin and sent her back to assisted living. Family refused for any GI consultation. Pt remained at baseline, asymptomatic except baseline dementia, hgb remained stable after 2 units transfusion on day of admission. Pt stable for discharge.     PHYSICAL EXAM:    Vital Signs Last 24 Hrs  T(C): 36.8 (25 Jan 2018 05:11), Max: 36.8 (24 Jan 2018 17:19)  T(F): 98.2 (25 Jan 2018 05:11), Max: 98.3 (24 Jan 2018 17:19)  HR: 80 (25 Jan 2018 05:11) (78 - 86)  BP: 118/57 (25 Jan 2018 05:11) (118/57 - 143/72)  BP(mean): --  RR: 20 (24 Jan 2018 17:19) (20 - 20)  SpO2: 94% (24 Jan 2018 22:00) (94% - 94%)    GENERAL: Pt lying comfortably, NAD.  CHEST/LUNG: Clear to auscultation bilaterally; No wheezing.  HEART: S1S2+, Regular rate and rhythm; No murmurs.  ABDOMEN: Soft, Nontender, Nondistended; Bowel sounds present.  NEURO: AAOX0, moves all 4 extremities.     Total time spent on discharge 32 minutes.

## 2018-04-08 ENCOUNTER — EMERGENCY (EMERGENCY)
Facility: HOSPITAL | Age: 83
LOS: 1 days | Discharge: HOME | End: 2018-04-08
Attending: EMERGENCY MEDICINE
Payer: MEDICARE

## 2018-04-08 VITALS — WEIGHT: 145.06 LBS

## 2018-04-08 LAB
ALBUMIN SERPL ELPH-MCNC: 3.8 G/DL — SIGNIFICANT CHANGE UP (ref 3.3–5.2)
ALP SERPL-CCNC: 107 U/L — SIGNIFICANT CHANGE UP (ref 40–120)
ALT FLD-CCNC: 17 U/L — SIGNIFICANT CHANGE UP
ANION GAP SERPL CALC-SCNC: 25 MMOL/L — HIGH (ref 5–17)
ANISOCYTOSIS BLD QL: SLIGHT — SIGNIFICANT CHANGE UP
APPEARANCE UR: CLEAR — SIGNIFICANT CHANGE UP
APTT BLD: 25.5 SEC — LOW (ref 27.5–37.4)
AST SERPL-CCNC: 24 U/L — SIGNIFICANT CHANGE UP
BACTERIA # UR AUTO: ABNORMAL
BASOPHILS # BLD AUTO: 0 K/UL — SIGNIFICANT CHANGE UP (ref 0–0.2)
BASOPHILS # BLD AUTO: 0 K/UL — SIGNIFICANT CHANGE UP (ref 0–0.2)
BASOPHILS NFR BLD AUTO: 0.2 % — SIGNIFICANT CHANGE UP (ref 0–2)
BASOPHILS NFR BLD AUTO: 0.3 % — SIGNIFICANT CHANGE UP (ref 0–2)
BILIRUB SERPL-MCNC: 0.2 MG/DL — LOW (ref 0.4–2)
BILIRUB UR-MCNC: NEGATIVE — SIGNIFICANT CHANGE UP
BLD GP AB SCN SERPL QL: SIGNIFICANT CHANGE UP
BUN SERPL-MCNC: 18 MG/DL — SIGNIFICANT CHANGE UP (ref 8–20)
CALCIUM SERPL-MCNC: 9.2 MG/DL — SIGNIFICANT CHANGE UP (ref 8.6–10.2)
CHLORIDE SERPL-SCNC: 96 MMOL/L — LOW (ref 98–107)
CO2 SERPL-SCNC: 24 MMOL/L — SIGNIFICANT CHANGE UP (ref 22–29)
COLOR SPEC: YELLOW — SIGNIFICANT CHANGE UP
CREAT SERPL-MCNC: 1.23 MG/DL — SIGNIFICANT CHANGE UP (ref 0.5–1.3)
DIFF PNL FLD: NEGATIVE — SIGNIFICANT CHANGE UP
EOSINOPHIL # BLD AUTO: 0 K/UL — SIGNIFICANT CHANGE UP (ref 0–0.5)
EOSINOPHIL # BLD AUTO: 0.1 K/UL — SIGNIFICANT CHANGE UP (ref 0–0.5)
EOSINOPHIL NFR BLD AUTO: 0.3 % — SIGNIFICANT CHANGE UP (ref 0–6)
EOSINOPHIL NFR BLD AUTO: 0.8 % — SIGNIFICANT CHANGE UP (ref 0–6)
EPI CELLS # UR: SIGNIFICANT CHANGE UP
GLUCOSE SERPL-MCNC: 257 MG/DL — HIGH (ref 70–115)
GLUCOSE UR QL: NEGATIVE MG/DL — SIGNIFICANT CHANGE UP
HCT VFR BLD CALC: 37.3 % — SIGNIFICANT CHANGE UP (ref 37–47)
HCT VFR BLD CALC: 43.5 % — SIGNIFICANT CHANGE UP (ref 37–47)
HGB BLD-MCNC: 11.9 G/DL — LOW (ref 12–16)
HGB BLD-MCNC: 13.7 G/DL — SIGNIFICANT CHANGE UP (ref 12–16)
HYALINE CASTS # UR AUTO: ABNORMAL /LPF
INR BLD: 1.07 RATIO — SIGNIFICANT CHANGE UP (ref 0.88–1.16)
KETONES UR-MCNC: ABNORMAL
LEUKOCYTE ESTERASE UR-ACNC: ABNORMAL
LYMPHOCYTES # BLD AUTO: 1.1 K/UL — SIGNIFICANT CHANGE UP (ref 1–4.8)
LYMPHOCYTES # BLD AUTO: 1.6 K/UL — SIGNIFICANT CHANGE UP (ref 1–4.8)
LYMPHOCYTES # BLD AUTO: 18 % — LOW (ref 20–55)
LYMPHOCYTES # BLD AUTO: 8.1 % — LOW (ref 20–55)
MCHC RBC-ENTMCNC: 28.9 PG — SIGNIFICANT CHANGE UP (ref 27–31)
MCHC RBC-ENTMCNC: 29.2 PG — SIGNIFICANT CHANGE UP (ref 27–31)
MCHC RBC-ENTMCNC: 31.5 G/DL — LOW (ref 32–36)
MCHC RBC-ENTMCNC: 31.9 G/DL — LOW (ref 32–36)
MCV RBC AUTO: 90.5 FL — SIGNIFICANT CHANGE UP (ref 81–99)
MCV RBC AUTO: 92.8 FL — SIGNIFICANT CHANGE UP (ref 81–99)
MONOCYTES # BLD AUTO: 0.6 K/UL — SIGNIFICANT CHANGE UP (ref 0–0.8)
MONOCYTES # BLD AUTO: 0.9 K/UL — HIGH (ref 0–0.8)
MONOCYTES NFR BLD AUTO: 6.6 % — SIGNIFICANT CHANGE UP (ref 3–10)
MONOCYTES NFR BLD AUTO: 7.1 % — SIGNIFICANT CHANGE UP (ref 3–10)
NEUTROPHILS # BLD AUTO: 11.4 K/UL — HIGH (ref 1.8–8)
NEUTROPHILS # BLD AUTO: 6.8 K/UL — SIGNIFICANT CHANGE UP (ref 1.8–8)
NEUTROPHILS NFR BLD AUTO: 74.3 % — HIGH (ref 37–73)
NEUTROPHILS NFR BLD AUTO: 83.9 % — HIGH (ref 37–73)
NITRITE UR-MCNC: NEGATIVE — SIGNIFICANT CHANGE UP
OB PNL STL: NEGATIVE — SIGNIFICANT CHANGE UP
OVALOCYTES BLD QL SMEAR: SLIGHT — SIGNIFICANT CHANGE UP
PH UR: 6 — SIGNIFICANT CHANGE UP (ref 5–8)
PLAT MORPH BLD: NORMAL — SIGNIFICANT CHANGE UP
PLATELET # BLD AUTO: 283 K/UL — SIGNIFICANT CHANGE UP (ref 150–400)
PLATELET # BLD AUTO: 326 K/UL — SIGNIFICANT CHANGE UP (ref 150–400)
POTASSIUM SERPL-MCNC: 3.7 MMOL/L — SIGNIFICANT CHANGE UP (ref 3.5–5.3)
POTASSIUM SERPL-SCNC: 3.7 MMOL/L — SIGNIFICANT CHANGE UP (ref 3.5–5.3)
PROT SERPL-MCNC: 7.1 G/DL — SIGNIFICANT CHANGE UP (ref 6.6–8.7)
PROT UR-MCNC: 15 MG/DL
PROTHROM AB SERPL-ACNC: 11.8 SEC — SIGNIFICANT CHANGE UP (ref 9.8–12.7)
RBC # BLD: 4.12 M/UL — LOW (ref 4.4–5.2)
RBC # BLD: 4.69 M/UL — SIGNIFICANT CHANGE UP (ref 4.4–5.2)
RBC # FLD: 19.9 % — HIGH (ref 11–15.6)
RBC # FLD: 20.1 % — HIGH (ref 11–15.6)
RBC BLD AUTO: ABNORMAL
RBC CASTS # UR COMP ASSIST: SIGNIFICANT CHANGE UP /HPF (ref 0–4)
SODIUM SERPL-SCNC: 145 MMOL/L — SIGNIFICANT CHANGE UP (ref 135–145)
SP GR SPEC: 1.01 — SIGNIFICANT CHANGE UP (ref 1.01–1.02)
TARGETS BLD QL SMEAR: SLIGHT — SIGNIFICANT CHANGE UP
TYPE + AB SCN PNL BLD: SIGNIFICANT CHANGE UP
UROBILINOGEN FLD QL: NEGATIVE MG/DL — SIGNIFICANT CHANGE UP
WBC # BLD: 13.5 K/UL — HIGH (ref 4.8–10.8)
WBC # BLD: 9.2 K/UL — SIGNIFICANT CHANGE UP (ref 4.8–10.8)
WBC # FLD AUTO: 13.5 K/UL — HIGH (ref 4.8–10.8)
WBC # FLD AUTO: 9.2 K/UL — SIGNIFICANT CHANGE UP (ref 4.8–10.8)
WBC UR QL: SIGNIFICANT CHANGE UP

## 2018-04-08 PROCEDURE — 86900 BLOOD TYPING SEROLOGIC ABO: CPT

## 2018-04-08 PROCEDURE — 96372 THER/PROPH/DIAG INJ SC/IM: CPT | Mod: XU

## 2018-04-08 PROCEDURE — 80053 COMPREHEN METABOLIC PANEL: CPT

## 2018-04-08 PROCEDURE — 36415 COLL VENOUS BLD VENIPUNCTURE: CPT

## 2018-04-08 PROCEDURE — 86850 RBC ANTIBODY SCREEN: CPT

## 2018-04-08 PROCEDURE — 74177 CT ABD & PELVIS W/CONTRAST: CPT

## 2018-04-08 PROCEDURE — 74177 CT ABD & PELVIS W/CONTRAST: CPT | Mod: 26

## 2018-04-08 PROCEDURE — 85730 THROMBOPLASTIN TIME PARTIAL: CPT

## 2018-04-08 PROCEDURE — 86901 BLOOD TYPING SEROLOGIC RH(D): CPT

## 2018-04-08 PROCEDURE — 99284 EMERGENCY DEPT VISIT MOD MDM: CPT | Mod: 25

## 2018-04-08 PROCEDURE — 99284 EMERGENCY DEPT VISIT MOD MDM: CPT

## 2018-04-08 PROCEDURE — 81001 URINALYSIS AUTO W/SCOPE: CPT

## 2018-04-08 PROCEDURE — 82272 OCCULT BLD FECES 1-3 TESTS: CPT

## 2018-04-08 PROCEDURE — 85027 COMPLETE CBC AUTOMATED: CPT

## 2018-04-08 PROCEDURE — 85610 PROTHROMBIN TIME: CPT

## 2018-04-08 RX ORDER — HALOPERIDOL DECANOATE 100 MG/ML
5 INJECTION INTRAMUSCULAR ONCE
Qty: 0 | Refills: 0 | Status: COMPLETED | OUTPATIENT
Start: 2018-04-08 | End: 2018-04-08

## 2018-04-08 RX ADMIN — HALOPERIDOL DECANOATE 5 MILLIGRAM(S): 100 INJECTION INTRAMUSCULAR at 17:15

## 2018-04-08 NOTE — ED ADULT NURSE REASSESSMENT NOTE - NS ED NURSE REASSESS COMMENT FT1
MD Vicente performed rectal exam with RN at bedside, pt tolerated exam well, no sings of distress, family at bedside. pt more calm and cooperative at the moment.

## 2018-04-08 NOTE — ED ADULT NURSE NOTE - OBJECTIVE STATEMENT
89 year old female comes to ED sent from [facility] for evaluation. as per ems. [facility] states pt. had episodes of coffee ground emesis. pt. severely. agitated and has dementia. MD. Vicente called to bedside for evaluation. 5mg haldol given.

## 2018-04-08 NOTE — ED PROVIDER NOTE - MEDICAL DECISION MAKING DETAILS
per dr hernandez d/c if benign ct reexam sows pt in nad abd soft nt return to ed for intractable abd pain fever or any overall worsening per dr hernandez d/c if benign ct reexam shows pt in nad abd soft nt return to ed for intractable abd pain fever or any overall worsening. Pt has not had a single episode of emesis or diarrhea here int he dept. She tolerated PO with no difficulty and no pain. Heme occult negative, VSS.

## 2018-04-08 NOTE — ED PROVIDER NOTE - OBJECTIVE STATEMENT
89 y/o F pt with a pmhx of Anemia, COPD, dementia, hyperlipidemia and hypothyroidism BIBA to the ED c/o vomiting and diarrhea that onset today. Pt is coming from nursing home. Family/Aide at bedside to provide Hx that she has been having "dark colored vomiting and diarrhea." Pt is combative and states "there is no pain and here is nothing wrong." Unable to obtain full Hx and ros due to pt's dementia. 89 y/o F pt with a pmhx of Anemia, COPD, dementia, hyperlipidemia and hypothyroidism BIBA to the ED c/o vomiting and diarrhea that onset today. Pt is coming from nursing home. Family/Aide at bedside to provide Hx that she has been having "dark colored vomiting and diarrhea" but denies any presence of bloody vomitus or emesis. Pt is combative and states "there is no pain and here is nothing wrong." Unable to obtain full Hx and ros due to pt's dementia.

## 2018-04-08 NOTE — ED ADULT TRIAGE NOTE - CHIEF COMPLAINT QUOTE
BIBA, patient is and oriented to person and place sent from the assisted living for evaluation of possible coffee ground emesis

## 2018-04-09 VITALS
DIASTOLIC BLOOD PRESSURE: 76 MMHG | RESPIRATION RATE: 18 BRPM | HEART RATE: 72 BPM | TEMPERATURE: 97 F | OXYGEN SATURATION: 95 % | SYSTOLIC BLOOD PRESSURE: 130 MMHG

## 2018-05-12 ENCOUNTER — EMERGENCY (EMERGENCY)
Facility: HOSPITAL | Age: 83
LOS: 1 days | Discharge: HOME | End: 2018-05-12
Attending: EMERGENCY MEDICINE
Payer: MEDICARE

## 2018-05-12 VITALS
HEART RATE: 95 BPM | TEMPERATURE: 99 F | SYSTOLIC BLOOD PRESSURE: 144 MMHG | RESPIRATION RATE: 18 BRPM | OXYGEN SATURATION: 94 % | DIASTOLIC BLOOD PRESSURE: 86 MMHG

## 2018-05-12 VITALS
DIASTOLIC BLOOD PRESSURE: 78 MMHG | OXYGEN SATURATION: 97 % | SYSTOLIC BLOOD PRESSURE: 122 MMHG | TEMPERATURE: 98 F | RESPIRATION RATE: 16 BRPM | HEART RATE: 98 BPM

## 2018-05-12 LAB
ALBUMIN SERPL ELPH-MCNC: 3.8 G/DL — SIGNIFICANT CHANGE UP (ref 3.3–5.2)
ALP SERPL-CCNC: 99 U/L — SIGNIFICANT CHANGE UP (ref 40–120)
ALT FLD-CCNC: 17 U/L — SIGNIFICANT CHANGE UP
ANION GAP SERPL CALC-SCNC: 17 MMOL/L — SIGNIFICANT CHANGE UP (ref 5–17)
APTT BLD: 26.4 SEC — LOW (ref 27.5–37.4)
AST SERPL-CCNC: 24 U/L — SIGNIFICANT CHANGE UP
BASOPHILS # BLD AUTO: 0 K/UL — SIGNIFICANT CHANGE UP (ref 0–0.2)
BASOPHILS NFR BLD AUTO: 0.4 % — SIGNIFICANT CHANGE UP (ref 0–2)
BILIRUB SERPL-MCNC: <0.2 MG/DL — LOW (ref 0.4–2)
BUN SERPL-MCNC: 28 MG/DL — HIGH (ref 8–20)
CALCIUM SERPL-MCNC: 9.6 MG/DL — SIGNIFICANT CHANGE UP (ref 8.6–10.2)
CHLORIDE SERPL-SCNC: 100 MMOL/L — SIGNIFICANT CHANGE UP (ref 98–107)
CO2 SERPL-SCNC: 28 MMOL/L — SIGNIFICANT CHANGE UP (ref 22–29)
CREAT SERPL-MCNC: 0.86 MG/DL — SIGNIFICANT CHANGE UP (ref 0.5–1.3)
EOSINOPHIL # BLD AUTO: 0 K/UL — SIGNIFICANT CHANGE UP (ref 0–0.5)
EOSINOPHIL NFR BLD AUTO: 0.3 % — SIGNIFICANT CHANGE UP (ref 0–6)
GLUCOSE SERPL-MCNC: 179 MG/DL — HIGH (ref 70–115)
HCT VFR BLD CALC: 42.4 % — SIGNIFICANT CHANGE UP (ref 37–47)
HGB BLD-MCNC: 13.8 G/DL — SIGNIFICANT CHANGE UP (ref 12–16)
INR BLD: 1.03 RATIO — SIGNIFICANT CHANGE UP (ref 0.88–1.16)
LYMPHOCYTES # BLD AUTO: 1.3 K/UL — SIGNIFICANT CHANGE UP (ref 1–4.8)
LYMPHOCYTES # BLD AUTO: 10 % — LOW (ref 20–55)
MCHC RBC-ENTMCNC: 30.6 PG — SIGNIFICANT CHANGE UP (ref 27–31)
MCHC RBC-ENTMCNC: 32.5 G/DL — SIGNIFICANT CHANGE UP (ref 32–36)
MCV RBC AUTO: 94 FL — SIGNIFICANT CHANGE UP (ref 81–99)
MONOCYTES # BLD AUTO: 0.8 K/UL — SIGNIFICANT CHANGE UP (ref 0–0.8)
MONOCYTES NFR BLD AUTO: 6.2 % — SIGNIFICANT CHANGE UP (ref 3–10)
NEUTROPHILS # BLD AUTO: 10.5 K/UL — HIGH (ref 1.8–8)
NEUTROPHILS NFR BLD AUTO: 82.9 % — HIGH (ref 37–73)
PLATELET # BLD AUTO: 311 K/UL — SIGNIFICANT CHANGE UP (ref 150–400)
POTASSIUM SERPL-MCNC: 3.5 MMOL/L — SIGNIFICANT CHANGE UP (ref 3.5–5.3)
POTASSIUM SERPL-SCNC: 3.5 MMOL/L — SIGNIFICANT CHANGE UP (ref 3.5–5.3)
PROT SERPL-MCNC: 7.3 G/DL — SIGNIFICANT CHANGE UP (ref 6.6–8.7)
PROTHROM AB SERPL-ACNC: 11.3 SEC — SIGNIFICANT CHANGE UP (ref 9.8–12.7)
RBC # BLD: 4.51 M/UL — SIGNIFICANT CHANGE UP (ref 4.4–5.2)
RBC # FLD: 14.2 % — SIGNIFICANT CHANGE UP (ref 11–15.6)
SODIUM SERPL-SCNC: 145 MMOL/L — SIGNIFICANT CHANGE UP (ref 135–145)
TROPONIN T SERPL-MCNC: <0.01 NG/ML — SIGNIFICANT CHANGE UP (ref 0–0.06)
TYPE + AB SCN PNL BLD: SIGNIFICANT CHANGE UP
WBC # BLD: 12.7 K/UL — HIGH (ref 4.8–10.8)
WBC # FLD AUTO: 12.7 K/UL — HIGH (ref 4.8–10.8)

## 2018-05-12 PROCEDURE — 84484 ASSAY OF TROPONIN QUANT: CPT

## 2018-05-12 PROCEDURE — 71045 X-RAY EXAM CHEST 1 VIEW: CPT

## 2018-05-12 PROCEDURE — 36415 COLL VENOUS BLD VENIPUNCTURE: CPT

## 2018-05-12 PROCEDURE — 70450 CT HEAD/BRAIN W/O DYE: CPT | Mod: 26

## 2018-05-12 PROCEDURE — 71045 X-RAY EXAM CHEST 1 VIEW: CPT | Mod: 26

## 2018-05-12 PROCEDURE — 85610 PROTHROMBIN TIME: CPT

## 2018-05-12 PROCEDURE — 99285 EMERGENCY DEPT VISIT HI MDM: CPT

## 2018-05-12 PROCEDURE — 70450 CT HEAD/BRAIN W/O DYE: CPT

## 2018-05-12 PROCEDURE — 72125 CT NECK SPINE W/O DYE: CPT | Mod: 26

## 2018-05-12 PROCEDURE — 99284 EMERGENCY DEPT VISIT MOD MDM: CPT | Mod: 25

## 2018-05-12 PROCEDURE — 85027 COMPLETE CBC AUTOMATED: CPT

## 2018-05-12 PROCEDURE — 96375 TX/PRO/DX INJ NEW DRUG ADDON: CPT

## 2018-05-12 PROCEDURE — 93005 ELECTROCARDIOGRAM TRACING: CPT

## 2018-05-12 PROCEDURE — 85730 THROMBOPLASTIN TIME PARTIAL: CPT

## 2018-05-12 PROCEDURE — 72125 CT NECK SPINE W/O DYE: CPT

## 2018-05-12 PROCEDURE — 80053 COMPREHEN METABOLIC PANEL: CPT

## 2018-05-12 PROCEDURE — 86850 RBC ANTIBODY SCREEN: CPT

## 2018-05-12 PROCEDURE — 86900 BLOOD TYPING SEROLOGIC ABO: CPT

## 2018-05-12 PROCEDURE — 96374 THER/PROPH/DIAG INJ IV PUSH: CPT

## 2018-05-12 PROCEDURE — 93010 ELECTROCARDIOGRAM REPORT: CPT

## 2018-05-12 PROCEDURE — 86901 BLOOD TYPING SEROLOGIC RH(D): CPT

## 2018-05-12 RX ORDER — PANTOPRAZOLE SODIUM 20 MG/1
40 TABLET, DELAYED RELEASE ORAL ONCE
Qty: 0 | Refills: 0 | Status: COMPLETED | OUTPATIENT
Start: 2018-05-12 | End: 2018-05-12

## 2018-05-12 RX ORDER — ONDANSETRON 8 MG/1
4 TABLET, FILM COATED ORAL ONCE
Qty: 0 | Refills: 0 | Status: COMPLETED | OUTPATIENT
Start: 2018-05-12 | End: 2018-05-12

## 2018-05-12 RX ADMIN — PANTOPRAZOLE SODIUM 40 MILLIGRAM(S): 20 TABLET, DELAYED RELEASE ORAL at 18:08

## 2018-05-12 RX ADMIN — Medication 1 MILLIGRAM(S): at 18:07

## 2018-05-12 RX ADMIN — ONDANSETRON 4 MILLIGRAM(S): 8 TABLET, FILM COATED ORAL at 18:07

## 2018-05-12 NOTE — ED PROVIDER NOTE - CARDIAC, MLM
Normal rate, regular rhythm.  Heart sounds S1, S2.  No murmurs, rubs or gallops, good pulses on all extremities

## 2018-05-12 NOTE — ED ADULT NURSE REASSESSMENT NOTE - NS ED NURSE REASSESS COMMENT FT1
pt in no apparent distress, resting comfortably, pt calm, no signs of anxiety. Family at bedside. Will continue to monitor.

## 2018-05-12 NOTE — ED PROVIDER NOTE - PROGRESS NOTE DETAILS
patient with episode of vomiting while in ED; appeared coffee ground  patient has remained stable since -- as per daughter - patient with similar episodes in the past - they do not want endoscopy or further intervention - daughter to take patient back to assisted living

## 2018-05-12 NOTE — ED PROVIDER NOTE - OBJECTIVE STATEMENT
89 y/o F pt with a hx of COPD, Asthma, HDL, hypothyroid, dementia presents to the ED s/p possible fall. Pt stays at a nursing home and someone from the facility stated that she may have fallen today. The fall was unwitnessed. However pt denies falling or hitting head. Denies fevers, chills, numbness, tingling. No further complaints at this time.

## 2018-05-12 NOTE — ED ADULT TRIAGE NOTE - CHIEF COMPLAINT QUOTE
unwitnessed fall; found on floor. no external signs of trauma. patient on no anticoags. unsure how she fell; hx dementia and is at baseline mental status. oriented x1

## 2018-05-12 NOTE — ED ADULT NURSE NOTE - OBJECTIVE STATEMENT
[facility]. unwitnessed fall/ patient refusing to answer questions. as per family patient does act like this sometimes because she does not like coming here.

## 2018-05-12 NOTE — ED ADULT NURSE REASSESSMENT NOTE - NS ED NURSE REASSESS COMMENT FT1
received from off going rn - patient awake and alert - base line normal per family - paitent with saline lock to right forearm removed as patient to be discharged - family at bedside taking patient back home

## 2018-06-21 ENCOUNTER — EMERGENCY (EMERGENCY)
Facility: HOSPITAL | Age: 83
LOS: 1 days | Discharge: HOME | End: 2018-06-21
Attending: EMERGENCY MEDICINE
Payer: MEDICARE

## 2018-06-21 VITALS
SYSTOLIC BLOOD PRESSURE: 132 MMHG | OXYGEN SATURATION: 93 % | HEIGHT: 61 IN | DIASTOLIC BLOOD PRESSURE: 82 MMHG | WEIGHT: 154.98 LBS | HEART RATE: 82 BPM | TEMPERATURE: 99 F | RESPIRATION RATE: 16 BRPM

## 2018-06-21 LAB
ALBUMIN SERPL ELPH-MCNC: 3.2 G/DL — LOW (ref 3.3–5.2)
ALP SERPL-CCNC: 79 U/L — SIGNIFICANT CHANGE UP (ref 40–120)
ALT FLD-CCNC: 13 U/L — SIGNIFICANT CHANGE UP
ANION GAP SERPL CALC-SCNC: 14 MMOL/L — SIGNIFICANT CHANGE UP (ref 5–17)
APTT BLD: 25 SEC — LOW (ref 27.5–37.4)
AST SERPL-CCNC: 23 U/L — SIGNIFICANT CHANGE UP
BASOPHILS # BLD AUTO: 0 K/UL — SIGNIFICANT CHANGE UP (ref 0–0.2)
BASOPHILS NFR BLD AUTO: 0.3 % — SIGNIFICANT CHANGE UP (ref 0–2)
BILIRUB SERPL-MCNC: <0.2 MG/DL — LOW (ref 0.4–2)
BUN SERPL-MCNC: 27 MG/DL — HIGH (ref 8–20)
CALCIUM SERPL-MCNC: 8.5 MG/DL — LOW (ref 8.6–10.2)
CHLORIDE SERPL-SCNC: 100 MMOL/L — SIGNIFICANT CHANGE UP (ref 98–107)
CO2 SERPL-SCNC: 27 MMOL/L — SIGNIFICANT CHANGE UP (ref 22–29)
CREAT SERPL-MCNC: 0.92 MG/DL — SIGNIFICANT CHANGE UP (ref 0.5–1.3)
EOSINOPHIL # BLD AUTO: 0 K/UL — SIGNIFICANT CHANGE UP (ref 0–0.5)
EOSINOPHIL NFR BLD AUTO: 0.4 % — SIGNIFICANT CHANGE UP (ref 0–6)
GLUCOSE SERPL-MCNC: 146 MG/DL — HIGH (ref 70–115)
HCT VFR BLD CALC: 36.9 % — LOW (ref 37–47)
HGB BLD-MCNC: 12.1 G/DL — SIGNIFICANT CHANGE UP (ref 12–16)
INR BLD: 1.03 RATIO — SIGNIFICANT CHANGE UP (ref 0.88–1.16)
LACTATE BLDV-MCNC: 2.1 MMOL/L — HIGH (ref 0.5–2)
LIDOCAIN IGE QN: 59 U/L — HIGH (ref 22–51)
LYMPHOCYTES # BLD AUTO: 1.1 K/UL — SIGNIFICANT CHANGE UP (ref 1–4.8)
LYMPHOCYTES # BLD AUTO: 9.3 % — LOW (ref 20–55)
MAGNESIUM SERPL-MCNC: 1.8 MG/DL — SIGNIFICANT CHANGE UP (ref 1.6–2.6)
MCHC RBC-ENTMCNC: 32 PG — HIGH (ref 27–31)
MCHC RBC-ENTMCNC: 32.8 G/DL — SIGNIFICANT CHANGE UP (ref 32–36)
MCV RBC AUTO: 97.6 FL — SIGNIFICANT CHANGE UP (ref 81–99)
MONOCYTES # BLD AUTO: 0.8 K/UL — SIGNIFICANT CHANGE UP (ref 0–0.8)
MONOCYTES NFR BLD AUTO: 7.2 % — SIGNIFICANT CHANGE UP (ref 3–10)
NEUTROPHILS # BLD AUTO: 9.5 K/UL — HIGH (ref 1.8–8)
NEUTROPHILS NFR BLD AUTO: 82.5 % — HIGH (ref 37–73)
OB PNL STL: NEGATIVE — SIGNIFICANT CHANGE UP
PLATELET # BLD AUTO: 285 K/UL — SIGNIFICANT CHANGE UP (ref 150–400)
POTASSIUM SERPL-MCNC: 3.2 MMOL/L — LOW (ref 3.5–5.3)
POTASSIUM SERPL-SCNC: 3.2 MMOL/L — LOW (ref 3.5–5.3)
PROT SERPL-MCNC: 6.2 G/DL — LOW (ref 6.6–8.7)
PROTHROM AB SERPL-ACNC: 11.3 SEC — SIGNIFICANT CHANGE UP (ref 9.8–12.7)
RBC # BLD: 3.78 M/UL — LOW (ref 4.4–5.2)
RBC # FLD: 13 % — SIGNIFICANT CHANGE UP (ref 11–15.6)
SODIUM SERPL-SCNC: 141 MMOL/L — SIGNIFICANT CHANGE UP (ref 135–145)
TROPONIN T SERPL-MCNC: <0.01 NG/ML — SIGNIFICANT CHANGE UP (ref 0–0.06)
TSH SERPL-MCNC: <0.1 UIU/ML — LOW (ref 0.27–4.2)
WBC # BLD: 11.4 K/UL — HIGH (ref 4.8–10.8)
WBC # FLD AUTO: 11.4 K/UL — HIGH (ref 4.8–10.8)

## 2018-06-21 PROCEDURE — 84443 ASSAY THYROID STIM HORMONE: CPT

## 2018-06-21 PROCEDURE — 85610 PROTHROMBIN TIME: CPT

## 2018-06-21 PROCEDURE — 85027 COMPLETE CBC AUTOMATED: CPT

## 2018-06-21 PROCEDURE — 99283 EMERGENCY DEPT VISIT LOW MDM: CPT

## 2018-06-21 PROCEDURE — 80053 COMPREHEN METABOLIC PANEL: CPT

## 2018-06-21 PROCEDURE — 84484 ASSAY OF TROPONIN QUANT: CPT

## 2018-06-21 PROCEDURE — 93010 ELECTROCARDIOGRAM REPORT: CPT

## 2018-06-21 PROCEDURE — 82272 OCCULT BLD FECES 1-3 TESTS: CPT

## 2018-06-21 PROCEDURE — 83605 ASSAY OF LACTIC ACID: CPT

## 2018-06-21 PROCEDURE — 99284 EMERGENCY DEPT VISIT MOD MDM: CPT

## 2018-06-21 PROCEDURE — 83690 ASSAY OF LIPASE: CPT

## 2018-06-21 PROCEDURE — 83735 ASSAY OF MAGNESIUM: CPT

## 2018-06-21 PROCEDURE — 93005 ELECTROCARDIOGRAM TRACING: CPT

## 2018-06-21 PROCEDURE — 85730 THROMBOPLASTIN TIME PARTIAL: CPT

## 2018-06-21 PROCEDURE — 36415 COLL VENOUS BLD VENIPUNCTURE: CPT

## 2018-06-21 NOTE — ED PROVIDER NOTE - MEDICAL DECISION MAKING DETAILS
89 y/o F presents with black residual vomiting. Family wants to be conservative; pt is upset regarding recurrent episodes with no dx or pathology. Will check labs and re-evaluate.

## 2018-06-21 NOTE — ED PROVIDER NOTE - PHYSICAL EXAMINATION
Sitting comfortably, talking in full sentences. black residual vomit on shirt and shoes.   NC AT , no swelling  eomi, no swelling  mm moist, poor dentition. No blood in mouth.   supple, trachea in midline  Scott air entry, symm chest expansion  S1 S2 distant  abd soft, non tender, old surgical scar  no groin swelling  ext no swelling, no deformity, no spine tenderness. FROM all extremities x4.   Neuro AAO 3 no focal deficits

## 2018-06-21 NOTE — ED ADULT NURSE NOTE - OBJECTIVE STATEMENT
Pt A&Ox0-1, comes from [facility] was having vomiting of dark coffee ground emesis at this time, daughter at bedside pt has dementia knows first name is patients baseline. Pt resting comfortably, VSS, no signs of distress at this time, safety maintained, call bell in reach.

## 2018-06-21 NOTE — ED PROVIDER NOTE - OBJECTIVE STATEMENT
89 y/o F residing at [facility] presents to ED with family at beside c/o vomiting x few hours onset today. Pt's daughter states that as per nursing home, pt was vomiting dark liquid for a few hours today at the [facility]. She requested to go to the hospital, though daughter states she does not usually like coming here. Now that pt is at the hospital she states she is feeling better and wants to go home. Pt has had this episode of vomiting in the past. 89 y/o F with hx of anemia, dementia, COPD residing at [facility] presents to ED with family at beside c/o vomiting x few hours onset today. Pt's daughter states that as per nursing home, pt was vomiting dark liquid for a few hours [facility] at the [facility]. She requested to go to the hospital, though daughter states she does not usually like coming here. Now that pt is at the hospital she states she is feeling better and wants to go home. Pt has had this episode of vomiting in the past. No current aggravating factors. Poor historian. No further complaints at this time.

## 2018-06-21 NOTE — ED PROVIDER NOTE - PROGRESS NOTE DETAILS
Family would like to take pt back, once lab work shows no drop in hemoglobin. patient tolerating po well, observed, as per family has been seen for similar complaints multiple times

## 2018-06-21 NOTE — ED ADULT TRIAGE NOTE - CHIEF COMPLAINT QUOTE
"I just don't feel good, I feel uncomfortable" Pt pointing to stomach but denies pain. Abd is soft and non-tender. EMS reports pt to have had coffee ground emesis prior to arrival to [facility]. Pt is disoriented, calm and cooperative upon triage.

## 2018-06-21 NOTE — ED ADULT NURSE NOTE - CHIEF COMPLAINT QUOTE
"I just don't feel good, I feel uncomfortable" Pt pointing to stomach but denies pain. Abd is soft and non-tender. EMS reports pt to have had coffee ground emesis prior to arrival to [facility]. Pt is disoriented, calm and cooperative upon triage.

## 2018-06-22 VITALS
TEMPERATURE: 98 F | SYSTOLIC BLOOD PRESSURE: 127 MMHG | OXYGEN SATURATION: 94 % | DIASTOLIC BLOOD PRESSURE: 75 MMHG | RESPIRATION RATE: 16 BRPM | HEART RATE: 74 BPM

## 2018-06-30 ENCOUNTER — EMERGENCY (EMERGENCY)
Facility: HOSPITAL | Age: 83
LOS: 1 days | Discharge: HOME | End: 2018-06-30
Attending: EMERGENCY MEDICINE
Payer: MEDICARE

## 2018-06-30 VITALS
HEART RATE: 89 BPM | RESPIRATION RATE: 16 BRPM | SYSTOLIC BLOOD PRESSURE: 167 MMHG | HEIGHT: 61 IN | DIASTOLIC BLOOD PRESSURE: 95 MMHG | TEMPERATURE: 99 F | OXYGEN SATURATION: 97 % | WEIGHT: 130.07 LBS

## 2018-06-30 LAB
ALBUMIN SERPL ELPH-MCNC: 3.6 G/DL — SIGNIFICANT CHANGE UP (ref 3.3–5.2)
ALP SERPL-CCNC: 106 U/L — SIGNIFICANT CHANGE UP (ref 40–120)
ALT FLD-CCNC: 13 U/L — SIGNIFICANT CHANGE UP
ANION GAP SERPL CALC-SCNC: 18 MMOL/L — HIGH (ref 5–17)
APAP SERPL-MCNC: <7.5 UG/ML — LOW (ref 10–26)
AST SERPL-CCNC: 27 U/L — SIGNIFICANT CHANGE UP
BASOPHILS # BLD AUTO: 0 K/UL — SIGNIFICANT CHANGE UP (ref 0–0.2)
BASOPHILS NFR BLD AUTO: 0.4 % — SIGNIFICANT CHANGE UP (ref 0–2)
BILIRUB SERPL-MCNC: 0.4 MG/DL — SIGNIFICANT CHANGE UP (ref 0.4–2)
BUN SERPL-MCNC: 15 MG/DL — SIGNIFICANT CHANGE UP (ref 8–20)
CALCIUM SERPL-MCNC: 9.8 MG/DL — SIGNIFICANT CHANGE UP (ref 8.6–10.2)
CHLORIDE SERPL-SCNC: 96 MMOL/L — LOW (ref 98–107)
CK SERPL-CCNC: 91 U/L — SIGNIFICANT CHANGE UP (ref 25–170)
CO2 SERPL-SCNC: 25 MMOL/L — SIGNIFICANT CHANGE UP (ref 22–29)
CREAT SERPL-MCNC: 0.88 MG/DL — SIGNIFICANT CHANGE UP (ref 0.5–1.3)
EOSINOPHIL # BLD AUTO: 0.3 K/UL — SIGNIFICANT CHANGE UP (ref 0–0.5)
EOSINOPHIL NFR BLD AUTO: 3 % — SIGNIFICANT CHANGE UP (ref 0–6)
GLUCOSE SERPL-MCNC: 121 MG/DL — HIGH (ref 70–115)
HCT VFR BLD CALC: 39.8 % — SIGNIFICANT CHANGE UP (ref 37–47)
HGB BLD-MCNC: 13.2 G/DL — SIGNIFICANT CHANGE UP (ref 12–16)
LYMPHOCYTES # BLD AUTO: 3.8 K/UL — SIGNIFICANT CHANGE UP (ref 1–4.8)
LYMPHOCYTES # BLD AUTO: 40 % — SIGNIFICANT CHANGE UP (ref 20–55)
MCHC RBC-ENTMCNC: 31.9 PG — HIGH (ref 27–31)
MCHC RBC-ENTMCNC: 33.2 G/DL — SIGNIFICANT CHANGE UP (ref 32–36)
MCV RBC AUTO: 96.1 FL — SIGNIFICANT CHANGE UP (ref 81–99)
MONOCYTES # BLD AUTO: 0.9 K/UL — HIGH (ref 0–0.8)
MONOCYTES NFR BLD AUTO: 9.3 % — SIGNIFICANT CHANGE UP (ref 3–10)
NEUTROPHILS # BLD AUTO: 4.5 K/UL — SIGNIFICANT CHANGE UP (ref 1.8–8)
NEUTROPHILS NFR BLD AUTO: 47.1 % — SIGNIFICANT CHANGE UP (ref 37–73)
NT-PROBNP SERPL-SCNC: 90 PG/ML — SIGNIFICANT CHANGE UP (ref 0–300)
PLATELET # BLD AUTO: 320 K/UL — SIGNIFICANT CHANGE UP (ref 150–400)
POTASSIUM SERPL-MCNC: 3.5 MMOL/L — SIGNIFICANT CHANGE UP (ref 3.5–5.3)
POTASSIUM SERPL-SCNC: 3.5 MMOL/L — SIGNIFICANT CHANGE UP (ref 3.5–5.3)
PROT SERPL-MCNC: 7.3 G/DL — SIGNIFICANT CHANGE UP (ref 6.6–8.7)
RBC # BLD: 4.14 M/UL — LOW (ref 4.4–5.2)
RBC # FLD: 13.4 % — SIGNIFICANT CHANGE UP (ref 11–15.6)
SALICYLATES SERPL-MCNC: <0.6 MG/DL — LOW (ref 10–20)
SODIUM SERPL-SCNC: 139 MMOL/L — SIGNIFICANT CHANGE UP (ref 135–145)
TROPONIN T SERPL-MCNC: <0.01 NG/ML — SIGNIFICANT CHANGE UP (ref 0–0.06)
TSH SERPL-MCNC: <0.1 UIU/ML — LOW (ref 0.27–4.2)
WBC # BLD: 9.6 K/UL — SIGNIFICANT CHANGE UP (ref 4.8–10.8)
WBC # FLD AUTO: 9.6 K/UL — SIGNIFICANT CHANGE UP (ref 4.8–10.8)

## 2018-06-30 PROCEDURE — 99284 EMERGENCY DEPT VISIT MOD MDM: CPT

## 2018-06-30 PROCEDURE — 71045 X-RAY EXAM CHEST 1 VIEW: CPT | Mod: 26

## 2018-06-30 PROCEDURE — 70450 CT HEAD/BRAIN W/O DYE: CPT | Mod: 26

## 2018-06-30 RX ORDER — HALOPERIDOL DECANOATE 100 MG/ML
3 INJECTION INTRAMUSCULAR ONCE
Qty: 0 | Refills: 0 | Status: COMPLETED | OUTPATIENT
Start: 2018-06-30 | End: 2018-06-30

## 2018-06-30 RX ADMIN — Medication 0.5 MILLIGRAM(S): at 22:03

## 2018-06-30 RX ADMIN — HALOPERIDOL DECANOATE 3 MILLIGRAM(S): 100 INJECTION INTRAMUSCULAR at 21:59

## 2018-06-30 NOTE — ED PROVIDER NOTE - PHYSICAL EXAMINATION
Const: Awake, alert. Oriented x 1. Screaming and swinging at me when I get close to her. Agitated.  HEENT: NC/AT. Dry mucous membranes.  Eyes: No scleral icterus.  Neck:. Full ROM without pain.  Cardiac: Tachycardic rate and regular rhythm. +S1/S2. No murmurs. Peripheral pulses 2+ and symmetric. No LE edema.  Resp: Speaking in full sentences. No evidence of respiratory distress. No wheezes, rales or rhonchi.  Abd: Soft, non-tender, non-distended. Normal bowel sounds in all 4 quadrants. No guarding or rebound.  Back: Spine midline and non-tender.  Skin: No rashes, abrasions or lacerations.  Neuro: Awake, alert. Screaming obscenities and punching at myself and other staff. Moves all extremities symmetrically. 5/5 strength symmetrically.

## 2018-06-30 NOTE — ED PROVIDER NOTE - MEDICAL DECISION MAKING DETAILS
Patient altered from NH, baseline A&O x 1. Found on the floor. Patient requires sedation due to trying to swing at staff and not being cooperative. Will evaluate for trauma, source of infection as cause of AMS.

## 2018-06-30 NOTE — ED PROVIDER NOTE - PROGRESS NOTE DETAILS
Patient signed out to overnight ED physician pending the rest of labs, CXR, UA and final disposition. recd sign out  comfortable, called attending, needs to stop synthroid, and check labs again in two weeks, called nursing home specifically to mention to stop synthroid and check thyroid function in two weeks

## 2018-06-30 NOTE — ED ADULT TRIAGE NOTE - CHIEF COMPLAINT QUOTE
Patient alert, confused, baseline as per EMS report. EMS stated patient was found on floor by staff at nursing home where she resides. Negative obvious trauma noted. Patient yelling, agitated. Patient threatening staff, swinging her fist each time a blood pressure was attempted. Patient alert, confused, baseline as per EMS report. EMS stated patient was found on floor by staff at nursing home where she resides. Negative obvious trauma noted. Patient yelling, agitated, pulling at her hair. Patient threatening staff, swinging her fist each time a blood pressure was attempted. Unable to be redirected. Patient alert, confused, baseline as per EMS report. EMS stated patient was found on floor by staff at nursing home where she resides. Negative obvious trauma noted. Patient yelling, agitated, pulling at her hair. Patient threatening staff, swinging her fist each time a blood pressure was attempted. Unable to be redirected. Belongings secured, patient placed in yellow gown, red socks provided. 1:1 initiated for safety.

## 2018-06-30 NOTE — ED PROVIDER NOTE - CARE PLAN
Principal Discharge DX:	Altered mental status, unspecified altered mental status type  Secondary Diagnosis:	Agitation requiring sedation protocol Principal Discharge DX:	Altered mental status, unspecified altered mental status type  Secondary Diagnosis:	Agitation requiring sedation protocol  Secondary Diagnosis:	Hyperthyroidism

## 2018-06-30 NOTE — ED PROVIDER NOTE - OBJECTIVE STATEMENT
Patient presents from nursing home, found on the floor, unclear how she got there or how long she has  been there,  very agitated. She is normally A&O x 1 at baseline, now more agitated than  usual, was pulling at her hair and swinging at staff in the nursing home. She was recently seen in this ED for AMS as well.    No further history can be provided. Patient is screaming that "you are all liars," "I hope all of you die," "The family down there sent me here and they're trying to kill me."

## 2018-07-01 VITALS
DIASTOLIC BLOOD PRESSURE: 59 MMHG | OXYGEN SATURATION: 92 % | RESPIRATION RATE: 21 BRPM | HEART RATE: 63 BPM | SYSTOLIC BLOOD PRESSURE: 121 MMHG

## 2018-07-01 LAB
APPEARANCE UR: CLEAR — SIGNIFICANT CHANGE UP
BILIRUB UR-MCNC: NEGATIVE — SIGNIFICANT CHANGE UP
COLOR SPEC: YELLOW — SIGNIFICANT CHANGE UP
DIFF PNL FLD: NEGATIVE — SIGNIFICANT CHANGE UP
EPI CELLS # UR: SIGNIFICANT CHANGE UP
GLUCOSE UR QL: NEGATIVE MG/DL — SIGNIFICANT CHANGE UP
KETONES UR-MCNC: NEGATIVE — SIGNIFICANT CHANGE UP
LEUKOCYTE ESTERASE UR-ACNC: ABNORMAL
NITRITE UR-MCNC: NEGATIVE — SIGNIFICANT CHANGE UP
PH UR: 7 — SIGNIFICANT CHANGE UP (ref 5–8)
PROT UR-MCNC: NEGATIVE MG/DL — SIGNIFICANT CHANGE UP
RBC CASTS # UR COMP ASSIST: NEGATIVE /HPF — SIGNIFICANT CHANGE UP (ref 0–4)
SP GR SPEC: 1.01 — SIGNIFICANT CHANGE UP (ref 1.01–1.02)
T3 SERPL-MCNC: 90 NG/DL — SIGNIFICANT CHANGE UP (ref 80–200)
T4 AB SER-ACNC: 8.7 UG/DL — SIGNIFICANT CHANGE UP (ref 4.5–12)
UROBILINOGEN FLD QL: NEGATIVE MG/DL — SIGNIFICANT CHANGE UP
WBC UR QL: SIGNIFICANT CHANGE UP

## 2018-07-01 PROCEDURE — 82550 ASSAY OF CK (CPK): CPT

## 2018-07-01 PROCEDURE — 84436 ASSAY OF TOTAL THYROXINE: CPT

## 2018-07-01 PROCEDURE — 81001 URINALYSIS AUTO W/SCOPE: CPT

## 2018-07-01 PROCEDURE — 84484 ASSAY OF TROPONIN QUANT: CPT

## 2018-07-01 PROCEDURE — 96375 TX/PRO/DX INJ NEW DRUG ADDON: CPT

## 2018-07-01 PROCEDURE — 80053 COMPREHEN METABOLIC PANEL: CPT

## 2018-07-01 PROCEDURE — 80307 DRUG TEST PRSMV CHEM ANLYZR: CPT

## 2018-07-01 PROCEDURE — 36415 COLL VENOUS BLD VENIPUNCTURE: CPT

## 2018-07-01 PROCEDURE — 85027 COMPLETE CBC AUTOMATED: CPT

## 2018-07-01 PROCEDURE — 93010 ELECTROCARDIOGRAM REPORT: CPT

## 2018-07-01 PROCEDURE — 99284 EMERGENCY DEPT VISIT MOD MDM: CPT | Mod: 25

## 2018-07-01 PROCEDURE — 84480 ASSAY TRIIODOTHYRONINE (T3): CPT

## 2018-07-01 PROCEDURE — 93005 ELECTROCARDIOGRAM TRACING: CPT

## 2018-07-01 PROCEDURE — 70450 CT HEAD/BRAIN W/O DYE: CPT

## 2018-07-01 PROCEDURE — 84443 ASSAY THYROID STIM HORMONE: CPT

## 2018-07-01 PROCEDURE — 96374 THER/PROPH/DIAG INJ IV PUSH: CPT

## 2018-07-01 PROCEDURE — 87086 URINE CULTURE/COLONY COUNT: CPT

## 2018-07-01 PROCEDURE — 71045 X-RAY EXAM CHEST 1 VIEW: CPT

## 2018-07-01 PROCEDURE — 83880 ASSAY OF NATRIURETIC PEPTIDE: CPT

## 2018-07-01 NOTE — ED ADULT NURSE REASSESSMENT NOTE - NS ED NURSE REASSESS COMMENT FT1
[facility] Sellers RN notified of need to stop synthroid and repeat levels in two weeks.
Pt discharged at this time, belongings returned, VSS,,  by [facility] service, pt safely put on transport stretcher, tolerated transfer well, safely transferred back to accepting facility.
called to pts bedside, md at bedside, security at bedside, pt extremely agitated contused trying to spit at and hit staff, iv established medicated per md
pt now sleeping resp unlabored, cardiac monitor and pulse ox in place
remains extremely agitated, unable to calm pt, trying to hit and spit at staff. ativan given per md, pt on cardiac monitor, continuous pulse ox and 1:1  in place. 02 via 2 l nc applied for room air sat 93%
@2238MD at pt bedside, pt increasingly agitated, screaming and threatening , unable to re-orient pt, additional staff members at bedside assisting in calming and re-directing pt, pt orders initiated.

## 2018-07-01 NOTE — ED ADULT NURSE NOTE - CHIEF COMPLAINT QUOTE
Patient alert, confused, baseline as per EMS report. EMS stated patient was found on floor by staff at nursing home where she resides. Negative obvious trauma noted. Patient yelling, agitated, pulling at her hair. Patient threatening staff, swinging her fist each time a blood pressure was attempted. Unable to be redirected. Belongings secured, patient placed in yellow gown, red socks provided. 1:1 initiated for safety.

## 2018-07-01 NOTE — ED ADULT NURSE NOTE - OBJECTIVE STATEMENT
Pt care assumed at 2110, Patient alert, confused, baseline as per EMS report. EMS stated patient was found on floor by staff at nursing home where she resides. Negative obvious trauma noted. Patient yelling, agitated, pulling at her hair. Patient threatening staff, swinging her fist each time a blood pressure was attempted. Unable to be redirected. Belongings secured, patient placed in yellow gown, red socks provided. 1:1 initiated for safety.

## 2018-07-02 LAB
CULTURE RESULTS: SIGNIFICANT CHANGE UP
SPECIMEN SOURCE: SIGNIFICANT CHANGE UP

## 2018-07-15 ENCOUNTER — EMERGENCY (EMERGENCY)
Facility: HOSPITAL | Age: 83
LOS: 1 days | Discharge: HOME | End: 2018-07-15
Attending: STUDENT IN AN ORGANIZED HEALTH CARE EDUCATION/TRAINING PROGRAM
Payer: MEDICARE

## 2018-07-15 VITALS
TEMPERATURE: 98 F | RESPIRATION RATE: 16 BRPM | SYSTOLIC BLOOD PRESSURE: 120 MMHG | DIASTOLIC BLOOD PRESSURE: 77 MMHG | WEIGHT: 132.06 LBS | OXYGEN SATURATION: 98 % | HEIGHT: 61 IN | HEART RATE: 86 BPM

## 2018-07-15 LAB
ALBUMIN SERPL ELPH-MCNC: 3.4 G/DL — SIGNIFICANT CHANGE UP (ref 3.3–5.2)
ALP SERPL-CCNC: 84 U/L — SIGNIFICANT CHANGE UP (ref 40–120)
ALT FLD-CCNC: 14 U/L — SIGNIFICANT CHANGE UP
ANION GAP SERPL CALC-SCNC: 15 MMOL/L — SIGNIFICANT CHANGE UP (ref 5–17)
APTT BLD: 23.2 SEC — LOW (ref 27.5–37.4)
AST SERPL-CCNC: 22 U/L — SIGNIFICANT CHANGE UP
BILIRUB SERPL-MCNC: <0.2 MG/DL — LOW (ref 0.4–2)
BLD GP AB SCN SERPL QL: SIGNIFICANT CHANGE UP
BUN SERPL-MCNC: 24 MG/DL — HIGH (ref 8–20)
CALCIUM SERPL-MCNC: 9.3 MG/DL — SIGNIFICANT CHANGE UP (ref 8.6–10.2)
CHLORIDE SERPL-SCNC: 100 MMOL/L — SIGNIFICANT CHANGE UP (ref 98–107)
CO2 SERPL-SCNC: 25 MMOL/L — SIGNIFICANT CHANGE UP (ref 22–29)
CREAT SERPL-MCNC: 0.77 MG/DL — SIGNIFICANT CHANGE UP (ref 0.5–1.3)
GLUCOSE SERPL-MCNC: 126 MG/DL — HIGH (ref 70–115)
HCT VFR BLD CALC: 39.2 % — SIGNIFICANT CHANGE UP (ref 37–47)
HGB BLD-MCNC: 12.5 G/DL — SIGNIFICANT CHANGE UP (ref 12–16)
INR BLD: 1.03 RATIO — SIGNIFICANT CHANGE UP (ref 0.88–1.16)
MCHC RBC-ENTMCNC: 31.5 PG — HIGH (ref 27–31)
MCHC RBC-ENTMCNC: 31.9 G/DL — LOW (ref 32–36)
MCV RBC AUTO: 98.7 FL — SIGNIFICANT CHANGE UP (ref 81–99)
PLATELET # BLD AUTO: 310 K/UL — SIGNIFICANT CHANGE UP (ref 150–400)
POTASSIUM SERPL-MCNC: 3.5 MMOL/L — SIGNIFICANT CHANGE UP (ref 3.5–5.3)
POTASSIUM SERPL-SCNC: 3.5 MMOL/L — SIGNIFICANT CHANGE UP (ref 3.5–5.3)
PROT SERPL-MCNC: 6.5 G/DL — LOW (ref 6.6–8.7)
PROTHROM AB SERPL-ACNC: 11.3 SEC — SIGNIFICANT CHANGE UP (ref 9.8–12.7)
RBC # BLD: 3.97 M/UL — LOW (ref 4.4–5.2)
RBC # FLD: 12.9 % — SIGNIFICANT CHANGE UP (ref 11–15.6)
SODIUM SERPL-SCNC: 140 MMOL/L — SIGNIFICANT CHANGE UP (ref 135–145)
TYPE + AB SCN PNL BLD: SIGNIFICANT CHANGE UP
WBC # BLD: 8.8 K/UL — SIGNIFICANT CHANGE UP (ref 4.8–10.8)
WBC # FLD AUTO: 8.8 K/UL — SIGNIFICANT CHANGE UP (ref 4.8–10.8)

## 2018-07-15 PROCEDURE — 70450 CT HEAD/BRAIN W/O DYE: CPT | Mod: 26

## 2018-07-15 PROCEDURE — 72125 CT NECK SPINE W/O DYE: CPT | Mod: 26

## 2018-07-15 PROCEDURE — 99284 EMERGENCY DEPT VISIT MOD MDM: CPT

## 2018-07-15 RX ORDER — DIPHENHYDRAMINE HCL 50 MG
25 CAPSULE ORAL ONCE
Qty: 0 | Refills: 0 | Status: COMPLETED | OUTPATIENT
Start: 2018-07-15 | End: 2018-07-15

## 2018-07-15 RX ORDER — DIPHENHYDRAMINE HCL 50 MG
25 CAPSULE ORAL ONCE
Qty: 0 | Refills: 0 | Status: DISCONTINUED | OUTPATIENT
Start: 2018-07-15 | End: 2018-07-15

## 2018-07-15 RX ORDER — HALOPERIDOL DECANOATE 100 MG/ML
1 INJECTION INTRAMUSCULAR ONCE
Qty: 0 | Refills: 0 | Status: DISCONTINUED | OUTPATIENT
Start: 2018-07-15 | End: 2018-07-15

## 2018-07-15 RX ORDER — HALOPERIDOL DECANOATE 100 MG/ML
2.5 INJECTION INTRAMUSCULAR ONCE
Qty: 0 | Refills: 0 | Status: COMPLETED | OUTPATIENT
Start: 2018-07-15 | End: 2018-07-15

## 2018-07-15 RX ADMIN — Medication 1 MILLIGRAM(S): at 23:20

## 2018-07-15 RX ADMIN — HALOPERIDOL DECANOATE 2.5 MILLIGRAM(S): 100 INJECTION INTRAMUSCULAR at 19:43

## 2018-07-15 RX ADMIN — Medication 25 MILLIGRAM(S): at 23:10

## 2018-07-15 RX ADMIN — Medication 25 MILLIGRAM(S): at 19:43

## 2018-07-15 NOTE — ED PROVIDER NOTE - CONSTITUTIONAL, MLM
normal... Well appearing, well nourished, awake, alert, oriented to person and in moderate distress. Somewhat agitated.

## 2018-07-15 NOTE — ED PROVIDER NOTE - NS_ ATTENDINGSCRIBEDETAILS _ED_A_ED_FT
I, Pelon Grewal, performed the initial face to face bedside interview with this patient regarding history of present illness, review of symptoms and relevant past medical, social and family history.  I completed an independent physical examination.    The history, relevant review of systems, past medical and surgical history, medical decision making, and physical examination was documented by the scribe in my presence and I attest to the accuracy of the documentation.

## 2018-07-15 NOTE — ED ADULT NURSE REASSESSMENT NOTE - NS ED NURSE REASSESS COMMENT FT1
Pt was yelling out and anxiou, medication adm as per order, pt vss, family at bedside, safety precautions in place will continue to closely monitor.

## 2018-07-15 NOTE — ED ADULT TRIAGE NOTE - CHIEF COMPLAINT QUOTE
found on floor at the [facility]; sometimes she falls sometimes she lays on the floor so unsure if she was a fall. patient is awake alert and oriented x 1; at baseline with her dementia dx.

## 2018-07-15 NOTE — ED PROVIDER NOTE - OBJECTIVE STATEMENT
89 y/o F pt with hx of anemia, COPD, dementia, and HLD presents to ED BIBA with healthcare proxys from [facility] s/p fall with no complaints. Per healthcare proxy's state that pt was found on the floor, but pt in ED states she doesn't know what happened and why she is in the ED. Per [facility] staff reports that sometimes she falls and other times she lays on the floor so it is unsure if she did fall. Per healthcare proxy's report that after the fall pt began producing a coffee ground color emesis. Unknown if pt hit her head. Pt's healthcare proxy's report that this usually happens a lot, and that it's become a routine now. Denies nausea and fever. No further complaints at this time.

## 2018-07-15 NOTE — ED ADULT NURSE NOTE - OBJECTIVE STATEMENT
pt arrived s/p fall at [facility], as per staff they don't know if she fell or put herself on the floor pt with no injuries noted

## 2018-07-15 NOTE — ED PROVIDER NOTE - MEDICAL DECISION MAKING DETAILS
Pt s/p fall presents to ED with no complaints but is vomiting coffee ground emesis. Questioning coffee ground vs. GI bleed, will order CT contrast abdomen and CT head, along with labs. CBC, CMP then re-evaluate.

## 2018-07-15 NOTE — ED PROVIDER NOTE - PROGRESS NOTE DETAILS
As per sign-out from Dr. Grewal patient with trip and fall, followedb y episode of vomiting. He states after extensive discussion with family the plan is to obtain CT head and repeat cbc. If stable patient to be discharged back to her residence. Labs are as noted and stable. Patient can return to [facility].

## 2018-07-16 VITALS
HEART RATE: 86 BPM | RESPIRATION RATE: 16 BRPM | SYSTOLIC BLOOD PRESSURE: 134 MMHG | OXYGEN SATURATION: 97 % | TEMPERATURE: 99 F | DIASTOLIC BLOOD PRESSURE: 72 MMHG

## 2018-07-16 LAB
HCT VFR BLD CALC: 37.3 % — SIGNIFICANT CHANGE UP (ref 37–47)
HGB BLD-MCNC: 12 G/DL — SIGNIFICANT CHANGE UP (ref 12–16)
MCHC RBC-ENTMCNC: 31.9 PG — HIGH (ref 27–31)
MCHC RBC-ENTMCNC: 32.2 G/DL — SIGNIFICANT CHANGE UP (ref 32–36)
MCV RBC AUTO: 99.2 FL — HIGH (ref 81–99)
PLATELET # BLD AUTO: 299 K/UL — SIGNIFICANT CHANGE UP (ref 150–400)
RBC # BLD: 3.76 M/UL — LOW (ref 4.4–5.2)
RBC # FLD: 13 % — SIGNIFICANT CHANGE UP (ref 11–15.6)
WBC # BLD: 7.3 K/UL — SIGNIFICANT CHANGE UP (ref 4.8–10.8)
WBC # FLD AUTO: 7.3 K/UL — SIGNIFICANT CHANGE UP (ref 4.8–10.8)

## 2018-07-16 PROCEDURE — 72125 CT NECK SPINE W/O DYE: CPT

## 2018-07-16 PROCEDURE — 86900 BLOOD TYPING SEROLOGIC ABO: CPT

## 2018-07-16 PROCEDURE — 86901 BLOOD TYPING SEROLOGIC RH(D): CPT

## 2018-07-16 PROCEDURE — 70450 CT HEAD/BRAIN W/O DYE: CPT

## 2018-07-16 PROCEDURE — 85027 COMPLETE CBC AUTOMATED: CPT

## 2018-07-16 PROCEDURE — 80053 COMPREHEN METABOLIC PANEL: CPT

## 2018-07-16 PROCEDURE — 96372 THER/PROPH/DIAG INJ SC/IM: CPT

## 2018-07-16 PROCEDURE — 85730 THROMBOPLASTIN TIME PARTIAL: CPT

## 2018-07-16 PROCEDURE — 99284 EMERGENCY DEPT VISIT MOD MDM: CPT | Mod: 25

## 2018-07-16 PROCEDURE — 36415 COLL VENOUS BLD VENIPUNCTURE: CPT

## 2018-07-16 PROCEDURE — 86850 RBC ANTIBODY SCREEN: CPT

## 2018-07-16 PROCEDURE — 85610 PROTHROMBIN TIME: CPT

## 2019-03-12 ENCOUNTER — EMERGENCY (EMERGENCY)
Facility: HOSPITAL | Age: 84
LOS: 1 days | Discharge: HOME | End: 2019-03-12
Attending: EMERGENCY MEDICINE
Payer: MEDICARE

## 2019-03-12 VITALS
TEMPERATURE: 98 F | HEART RATE: 92 BPM | RESPIRATION RATE: 18 BRPM | OXYGEN SATURATION: 95 % | SYSTOLIC BLOOD PRESSURE: 119 MMHG | DIASTOLIC BLOOD PRESSURE: 69 MMHG

## 2019-03-12 VITALS
SYSTOLIC BLOOD PRESSURE: 143 MMHG | TEMPERATURE: 98 F | HEART RATE: 88 BPM | HEIGHT: 64 IN | WEIGHT: 160.06 LBS | DIASTOLIC BLOOD PRESSURE: 69 MMHG | RESPIRATION RATE: 16 BRPM | OXYGEN SATURATION: 98 %

## 2019-03-12 LAB
ALBUMIN SERPL ELPH-MCNC: 3.7 G/DL — SIGNIFICANT CHANGE UP (ref 3.3–5.2)
ALP SERPL-CCNC: 96 U/L — SIGNIFICANT CHANGE UP (ref 40–120)
ALT FLD-CCNC: 14 U/L — SIGNIFICANT CHANGE UP
ANION GAP SERPL CALC-SCNC: 15 MMOL/L — SIGNIFICANT CHANGE UP (ref 5–17)
APTT BLD: 27 SEC — LOW (ref 27.5–36.3)
AST SERPL-CCNC: 21 U/L — SIGNIFICANT CHANGE UP
BASOPHILS # BLD AUTO: 0 K/UL — SIGNIFICANT CHANGE UP (ref 0–0.2)
BASOPHILS NFR BLD AUTO: 0.3 % — SIGNIFICANT CHANGE UP (ref 0–2)
BILIRUB SERPL-MCNC: <0.2 MG/DL — LOW (ref 0.4–2)
BLD GP AB SCN SERPL QL: SIGNIFICANT CHANGE UP
BUN SERPL-MCNC: 23 MG/DL — HIGH (ref 8–20)
CALCIUM SERPL-MCNC: 8.9 MG/DL — SIGNIFICANT CHANGE UP (ref 8.6–10.2)
CHLORIDE SERPL-SCNC: 104 MMOL/L — SIGNIFICANT CHANGE UP (ref 98–107)
CO2 SERPL-SCNC: 24 MMOL/L — SIGNIFICANT CHANGE UP (ref 22–29)
CREAT SERPL-MCNC: 0.92 MG/DL — SIGNIFICANT CHANGE UP (ref 0.5–1.3)
EOSINOPHIL # BLD AUTO: 0 K/UL — SIGNIFICANT CHANGE UP (ref 0–0.5)
EOSINOPHIL NFR BLD AUTO: 0.1 % — SIGNIFICANT CHANGE UP (ref 0–6)
GLUCOSE SERPL-MCNC: 188 MG/DL — HIGH (ref 70–115)
HCT VFR BLD CALC: 38.8 % — SIGNIFICANT CHANGE UP (ref 37–47)
HGB BLD-MCNC: 12.6 G/DL — SIGNIFICANT CHANGE UP (ref 12–16)
INR BLD: 0.98 RATIO — SIGNIFICANT CHANGE UP (ref 0.88–1.16)
LYMPHOCYTES # BLD AUTO: 0.7 K/UL — LOW (ref 1–4.8)
LYMPHOCYTES # BLD AUTO: 6.1 % — LOW (ref 20–55)
MCHC RBC-ENTMCNC: 32.2 PG — HIGH (ref 27–31)
MCHC RBC-ENTMCNC: 32.5 G/DL — SIGNIFICANT CHANGE UP (ref 32–36)
MCV RBC AUTO: 99.2 FL — HIGH (ref 81–99)
MONOCYTES # BLD AUTO: 0.8 K/UL — SIGNIFICANT CHANGE UP (ref 0–0.8)
MONOCYTES NFR BLD AUTO: 6.7 % — SIGNIFICANT CHANGE UP (ref 3–10)
NEUTROPHILS # BLD AUTO: 10.2 K/UL — HIGH (ref 1.8–8)
NEUTROPHILS NFR BLD AUTO: 86.6 % — HIGH (ref 37–73)
PLATELET # BLD AUTO: 250 K/UL — SIGNIFICANT CHANGE UP (ref 150–400)
POTASSIUM SERPL-MCNC: 3.5 MMOL/L — SIGNIFICANT CHANGE UP (ref 3.5–5.3)
POTASSIUM SERPL-SCNC: 3.5 MMOL/L — SIGNIFICANT CHANGE UP (ref 3.5–5.3)
PROT SERPL-MCNC: 6.7 G/DL — SIGNIFICANT CHANGE UP (ref 6.6–8.7)
PROTHROM AB SERPL-ACNC: 11.3 SEC — SIGNIFICANT CHANGE UP (ref 10–12.9)
RBC # BLD: 3.91 M/UL — LOW (ref 4.4–5.2)
RBC # FLD: 13.1 % — SIGNIFICANT CHANGE UP (ref 11–15.6)
SODIUM SERPL-SCNC: 143 MMOL/L — SIGNIFICANT CHANGE UP (ref 135–145)
TYPE + AB SCN PNL BLD: SIGNIFICANT CHANGE UP
WBC # BLD: 11.7 K/UL — HIGH (ref 4.8–10.8)
WBC # FLD AUTO: 11.7 K/UL — HIGH (ref 4.8–10.8)

## 2019-03-12 PROCEDURE — 99283 EMERGENCY DEPT VISIT LOW MDM: CPT

## 2019-03-12 PROCEDURE — 85027 COMPLETE CBC AUTOMATED: CPT

## 2019-03-12 PROCEDURE — 86850 RBC ANTIBODY SCREEN: CPT

## 2019-03-12 PROCEDURE — 74022 RADEX COMPL AQT ABD SERIES: CPT

## 2019-03-12 PROCEDURE — 99284 EMERGENCY DEPT VISIT MOD MDM: CPT

## 2019-03-12 PROCEDURE — 86901 BLOOD TYPING SEROLOGIC RH(D): CPT

## 2019-03-12 PROCEDURE — 85610 PROTHROMBIN TIME: CPT

## 2019-03-12 PROCEDURE — 80053 COMPREHEN METABOLIC PANEL: CPT

## 2019-03-12 PROCEDURE — 86900 BLOOD TYPING SEROLOGIC ABO: CPT

## 2019-03-12 PROCEDURE — 74022 RADEX COMPL AQT ABD SERIES: CPT | Mod: 26

## 2019-03-12 PROCEDURE — 36415 COLL VENOUS BLD VENIPUNCTURE: CPT

## 2019-03-12 PROCEDURE — 85730 THROMBOPLASTIN TIME PARTIAL: CPT

## 2019-03-12 RX ORDER — SODIUM CHLORIDE 9 MG/ML
3 INJECTION INTRAMUSCULAR; INTRAVENOUS; SUBCUTANEOUS ONCE
Qty: 0 | Refills: 0 | Status: COMPLETED | OUTPATIENT
Start: 2019-03-12 | End: 2019-03-12

## 2019-03-12 RX ADMIN — SODIUM CHLORIDE 3 MILLILITER(S): 9 INJECTION INTRAMUSCULAR; INTRAVENOUS; SUBCUTANEOUS at 21:10

## 2019-03-12 NOTE — ED PROVIDER NOTE - UNABLE TO OBTAIN
HPI limited due to patient's condition Dementia ROS unable to be obtained due to patient's condition

## 2019-03-12 NOTE — ED ADULT NURSE REASSESSMENT NOTE - NS ED NURSE REASSESS COMMENT FT1
Pt able to ambulate safely and steadily w/out assistance, denies dizziness/weakness upon standing, IV removed, pt d/c home w/ family, brought to ED exit via wheelchair w/ SNA.

## 2019-03-12 NOTE — ED PROVIDER NOTE - PROGRESS NOTE DETAILS
Labs as noted.  Pt with no vomiting while in ED.  Pt tolerating po without difficulty and is stable for d/c

## 2019-03-12 NOTE — ED PROVIDER NOTE - OBJECTIVE STATEMENT
89 y/o F pt with hx of anemia, COPD, dementia, HLD and hypothyroidism presents to ED from [facility] with son c/o hematemesis that began today. Per son, [facility] told him she was vomiting bright red blood beginning at 6:00 this morning. Pt's son states she has been seen for similar symptoms in the past, last was 3 weeks ago at [facility]. Denies fever, chills, CP, and SOB. No further complaints at this time.

## 2019-03-12 NOTE — ED ADULT NURSE NOTE - OBJECTIVE STATEMENT
As per pt daughter "the [facility] called me around 7:00 saying that she was throwing up bright red blood for an hour", pt has not vomited since 7pm, denies n/v/pain, states she is hungry, hx of dementia but acting appropriately as per daughter, resp even and unlabored, hx of anemia, unknown if pt has dark stool as per daughter, aide cleaned her up here but did not say anything

## 2019-05-19 ENCOUNTER — EMERGENCY (EMERGENCY)
Facility: HOSPITAL | Age: 84
LOS: 1 days | Discharge: HOME | End: 2019-05-19
Attending: EMERGENCY MEDICINE
Payer: MEDICARE

## 2019-05-19 VITALS
TEMPERATURE: 98 F | DIASTOLIC BLOOD PRESSURE: 61 MMHG | SYSTOLIC BLOOD PRESSURE: 107 MMHG | RESPIRATION RATE: 16 BRPM | HEART RATE: 82 BPM | OXYGEN SATURATION: 94 %

## 2019-05-19 VITALS
HEART RATE: 94 BPM | RESPIRATION RATE: 20 BRPM | TEMPERATURE: 98 F | OXYGEN SATURATION: 97 % | DIASTOLIC BLOOD PRESSURE: 84 MMHG | SYSTOLIC BLOOD PRESSURE: 144 MMHG

## 2019-05-19 LAB
ALBUMIN SERPL ELPH-MCNC: 3.8 G/DL — SIGNIFICANT CHANGE UP (ref 3.3–5.2)
ALP SERPL-CCNC: 108 U/L — SIGNIFICANT CHANGE UP (ref 40–120)
ALT FLD-CCNC: 15 U/L — SIGNIFICANT CHANGE UP
ANION GAP SERPL CALC-SCNC: 16 MMOL/L — SIGNIFICANT CHANGE UP (ref 5–17)
ANISOCYTOSIS BLD QL: SLIGHT — SIGNIFICANT CHANGE UP
AST SERPL-CCNC: 23 U/L — SIGNIFICANT CHANGE UP
BILIRUB SERPL-MCNC: 0.2 MG/DL — LOW (ref 0.4–2)
BUN SERPL-MCNC: 17 MG/DL — SIGNIFICANT CHANGE UP (ref 8–20)
CALCIUM SERPL-MCNC: 9.4 MG/DL — SIGNIFICANT CHANGE UP (ref 8.6–10.2)
CHLORIDE SERPL-SCNC: 98 MMOL/L — SIGNIFICANT CHANGE UP (ref 98–107)
CO2 SERPL-SCNC: 29 MMOL/L — SIGNIFICANT CHANGE UP (ref 22–29)
CREAT SERPL-MCNC: 0.94 MG/DL — SIGNIFICANT CHANGE UP (ref 0.5–1.3)
EOSINOPHIL NFR BLD AUTO: 1 % — SIGNIFICANT CHANGE UP (ref 0–5)
GLUCOSE SERPL-MCNC: 212 MG/DL — HIGH (ref 70–115)
HCT VFR BLD CALC: 43 % — SIGNIFICANT CHANGE UP (ref 37–47)
HGB BLD-MCNC: 14 G/DL — SIGNIFICANT CHANGE UP (ref 12–16)
LIDOCAIN IGE QN: 50 U/L — SIGNIFICANT CHANGE UP (ref 22–51)
LYMPHOCYTES # BLD AUTO: 6 % — LOW (ref 20–55)
MACROCYTES BLD QL: SLIGHT — SIGNIFICANT CHANGE UP
MCHC RBC-ENTMCNC: 32.3 PG — HIGH (ref 27–31)
MCHC RBC-ENTMCNC: 32.6 G/DL — SIGNIFICANT CHANGE UP (ref 32–36)
MCV RBC AUTO: 99.3 FL — HIGH (ref 81–99)
MONOCYTES NFR BLD AUTO: 5 % — SIGNIFICANT CHANGE UP (ref 3–10)
NEUTROPHILS NFR BLD AUTO: 88 % — HIGH (ref 37–73)
OB PNL STL: NEGATIVE — SIGNIFICANT CHANGE UP
PLAT MORPH BLD: SIGNIFICANT CHANGE UP
PLATELET # BLD AUTO: 301 K/UL — SIGNIFICANT CHANGE UP (ref 150–400)
POTASSIUM SERPL-MCNC: 3.5 MMOL/L — SIGNIFICANT CHANGE UP (ref 3.5–5.3)
POTASSIUM SERPL-SCNC: 3.5 MMOL/L — SIGNIFICANT CHANGE UP (ref 3.5–5.3)
PROT SERPL-MCNC: 7.2 G/DL — SIGNIFICANT CHANGE UP (ref 6.6–8.7)
RBC # BLD: 4.33 M/UL — LOW (ref 4.4–5.2)
RBC # FLD: 12.8 % — SIGNIFICANT CHANGE UP (ref 11–15.6)
RBC BLD AUTO: PRESENT — SIGNIFICANT CHANGE UP
SODIUM SERPL-SCNC: 143 MMOL/L — SIGNIFICANT CHANGE UP (ref 135–145)
WBC # BLD: 7.4 K/UL — SIGNIFICANT CHANGE UP (ref 4.8–10.8)
WBC # FLD AUTO: 7.4 K/UL — SIGNIFICANT CHANGE UP (ref 4.8–10.8)

## 2019-05-19 PROCEDURE — 36415 COLL VENOUS BLD VENIPUNCTURE: CPT

## 2019-05-19 PROCEDURE — 99284 EMERGENCY DEPT VISIT MOD MDM: CPT | Mod: 25

## 2019-05-19 PROCEDURE — 82272 OCCULT BLD FECES 1-3 TESTS: CPT

## 2019-05-19 PROCEDURE — 96374 THER/PROPH/DIAG INJ IV PUSH: CPT

## 2019-05-19 PROCEDURE — 85027 COMPLETE CBC AUTOMATED: CPT

## 2019-05-19 PROCEDURE — 80053 COMPREHEN METABOLIC PANEL: CPT

## 2019-05-19 PROCEDURE — 83690 ASSAY OF LIPASE: CPT

## 2019-05-19 PROCEDURE — 99284 EMERGENCY DEPT VISIT MOD MDM: CPT

## 2019-05-19 RX ORDER — ONDANSETRON 8 MG/1
4 TABLET, FILM COATED ORAL ONCE
Refills: 0 | Status: COMPLETED | OUTPATIENT
Start: 2019-05-19 | End: 2019-05-19

## 2019-05-19 RX ADMIN — ONDANSETRON 4 MILLIGRAM(S): 8 TABLET, FILM COATED ORAL at 19:03

## 2019-05-19 NOTE — ED ADULT NURSE NOTE - NSIMPLEMENTINTERV_GEN_ALL_ED
Implemented All Universal Safety Interventions:  [place] to call system. Call bell, personal items and telephone within reach. Instruct patient to call for assistance. Room bathroom lighting operational. Non-slip footwear when patient is off stretcher. Physically safe environment: no spills, clutter or unnecessary equipment. Stretcher in lowest position, wheels locked, appropriate side rails in place.

## 2019-05-19 NOTE — ED ADULT TRIAGE NOTE - CHIEF COMPLAINT QUOTE
Pt BIBA awake and alert, hx of dementia, pleasantly confused. Sent from [facility] for dark stools, no blood thinners. Pt denies any abdominal pain, nausea or diarrhea. Denies any fever or chills. Denies any CP or SOB, respirations even and unlabored.

## 2019-05-19 NOTE — ED PROVIDER NOTE - OBJECTIVE STATEMENT
Pt. sent in from [facility] due to dark stool. Family at bedside and also informed me that they were told that the patient had vomited today. Pt. has hx of dementia. Pt. is confused and does not know why she is in the hospital. Pt. currently denies any pain. NO chest pain. No abdominal pain. Family(son and daughter) states that the patient has had similar symptoms in the past. Family does not want excessive testing to be done on their mother. Pt. had a witnessed episode of vomiting. Pt. sent in from [facility] due to dark stool. Family at bedside and also informed me that they were told that the patient had vomited today. Pt. has hx of dementia. Pt. is confused and does not know why she is in the hospital. Pt. currently denies any pain. NO chest pain. No abdominal pain. Family(son and daughter) states that the patient has had similar symptoms in the past. Pt. was in the ED in March 2019 for vomiting and hematemesis. Family does not want excessive testing to be done on their mother. Pt. had a witnessed episode of vomiting in the ED. Non-bloody emesis. Pt. sent in from [facility] due to "BLACK TARRY STOOL" AND vomiting as per transfer note. Pt. sent in to r/o GI bleed.  Family at bedside and also informed me that they were told that the patient had vomited today. Pt. has hx of dementia. Pt. is confused and does not know why she is in the hospital. Pt. currently denies any pain. NO chest pain. No abdominal pain. Family(son and daughter) states that the patient has had similar symptoms in the past. Pt. was in the ED in March 2019 for vomiting and hematemesis. Family does not want excessive testing to be done on their mother. Pt. had a witnessed episode of vomiting in the ED. Non-bloody emesis.

## 2019-05-19 NOTE — ED ADULT NURSE REASSESSMENT NOTE - NS ED NURSE REASSESS COMMENT FT1
Handoff received from offgoing RN. Pt. received sleeping in stretcher, easily arousable to voice. Pt. AxOx1 to self as per baseline, family at the bedside. Vital signs stable and as charted. Pt. in no apparent distress. Pt. denies pain at this time. No active observed bleeding, vomiting, diarrhea at this time. 22g R. wrist IV remains patent. Side rails up x 2 for safety. Pt. safety and comfort measures maintained. Awaiting pt. dispo at this time.

## 2019-05-19 NOTE — ED PROVIDER NOTE - PROGRESS NOTE DETAILS
Labs discussed with family and patients. Pt. tolerating PO challenge with no vomiting. NO abdominal pain. Pt. stable for discharge back to NH. Daughter will take pt. back.

## 2019-05-19 NOTE — ED PROVIDER NOTE - CLINICAL SUMMARY MEDICAL DECISION MAKING FREE TEXT BOX
Pt. sent to ED to r/o GI bleed due to black stool. No occult blood noted. Pt. is on Iron pills. Will check labs and give anti-nausea medication for vomiting.

## 2019-05-19 NOTE — ED ADULT NURSE NOTE - OBJECTIVE STATEMENT
Patient presents to ED oriented to person, from [facility], sent in for "Black Tarry Stool/ Vomiting", requesting evaluation for GI bleed as per Transfer forms.  Patient denies any sort of pain, abdominal soft nontender.

## 2019-08-09 ENCOUNTER — EMERGENCY (EMERGENCY)
Facility: HOSPITAL | Age: 84
LOS: 1 days | Discharge: HOME | End: 2019-08-09
Attending: STUDENT IN AN ORGANIZED HEALTH CARE EDUCATION/TRAINING PROGRAM
Payer: MEDICARE

## 2019-08-09 VITALS
HEART RATE: 99 BPM | OXYGEN SATURATION: 94 % | SYSTOLIC BLOOD PRESSURE: 110 MMHG | DIASTOLIC BLOOD PRESSURE: 75 MMHG | WEIGHT: 130.07 LBS | RESPIRATION RATE: 16 BRPM | TEMPERATURE: 100 F | HEIGHT: 62 IN

## 2019-08-09 LAB
ALBUMIN SERPL ELPH-MCNC: 4.2 G/DL — SIGNIFICANT CHANGE UP (ref 3.3–5.2)
ALP SERPL-CCNC: 108 U/L — SIGNIFICANT CHANGE UP (ref 40–120)
ALT FLD-CCNC: 14 U/L — SIGNIFICANT CHANGE UP
ANION GAP SERPL CALC-SCNC: 14 MMOL/L — SIGNIFICANT CHANGE UP (ref 5–17)
APPEARANCE UR: CLEAR — SIGNIFICANT CHANGE UP
AST SERPL-CCNC: 23 U/L — SIGNIFICANT CHANGE UP
BASOPHILS # BLD AUTO: 0.04 K/UL — SIGNIFICANT CHANGE UP (ref 0–0.2)
BASOPHILS NFR BLD AUTO: 0.3 % — SIGNIFICANT CHANGE UP (ref 0–2)
BILIRUB SERPL-MCNC: 0.3 MG/DL — LOW (ref 0.4–2)
BILIRUB UR-MCNC: NEGATIVE — SIGNIFICANT CHANGE UP
BLD GP AB SCN SERPL QL: SIGNIFICANT CHANGE UP
BUN SERPL-MCNC: 22 MG/DL — HIGH (ref 8–20)
CALCIUM SERPL-MCNC: 9.4 MG/DL — SIGNIFICANT CHANGE UP (ref 8.6–10.2)
CHLORIDE SERPL-SCNC: 98 MMOL/L — SIGNIFICANT CHANGE UP (ref 98–107)
CO2 SERPL-SCNC: 31 MMOL/L — HIGH (ref 22–29)
COLOR SPEC: YELLOW — SIGNIFICANT CHANGE UP
CREAT SERPL-MCNC: 0.94 MG/DL — SIGNIFICANT CHANGE UP (ref 0.5–1.3)
DIFF PNL FLD: ABNORMAL
EOSINOPHIL # BLD AUTO: 0.02 K/UL — SIGNIFICANT CHANGE UP (ref 0–0.5)
EOSINOPHIL NFR BLD AUTO: 0.2 % — SIGNIFICANT CHANGE UP (ref 0–6)
EPI CELLS # UR: SIGNIFICANT CHANGE UP
GLUCOSE SERPL-MCNC: 245 MG/DL — HIGH (ref 70–115)
GLUCOSE UR QL: 100 MG/DL
HCT VFR BLD CALC: 39.9 % — SIGNIFICANT CHANGE UP (ref 34.5–45)
HGB BLD-MCNC: 13.3 G/DL — SIGNIFICANT CHANGE UP (ref 11.5–15.5)
IMM GRANULOCYTES NFR BLD AUTO: 0.3 % — SIGNIFICANT CHANGE UP (ref 0–1.5)
KETONES UR-MCNC: ABNORMAL
LEUKOCYTE ESTERASE UR-ACNC: NEGATIVE — SIGNIFICANT CHANGE UP
LYMPHOCYTES # BLD AUTO: 0.53 K/UL — LOW (ref 1–3.3)
LYMPHOCYTES # BLD AUTO: 4.1 % — LOW (ref 13–44)
MCHC RBC-ENTMCNC: 32.7 PG — SIGNIFICANT CHANGE UP (ref 27–34)
MCHC RBC-ENTMCNC: 33.3 GM/DL — SIGNIFICANT CHANGE UP (ref 32–36)
MCV RBC AUTO: 98 FL — SIGNIFICANT CHANGE UP (ref 80–100)
MONOCYTES # BLD AUTO: 0.42 K/UL — SIGNIFICANT CHANGE UP (ref 0–0.9)
MONOCYTES NFR BLD AUTO: 3.2 % — SIGNIFICANT CHANGE UP (ref 2–14)
NEUTROPHILS # BLD AUTO: 11.98 K/UL — HIGH (ref 1.8–7.4)
NEUTROPHILS NFR BLD AUTO: 91.9 % — HIGH (ref 43–77)
NITRITE UR-MCNC: NEGATIVE — SIGNIFICANT CHANGE UP
OB PNL STL: NEGATIVE — SIGNIFICANT CHANGE UP
PH UR: 6.5 — SIGNIFICANT CHANGE UP (ref 5–8)
PLATELET # BLD AUTO: 317 K/UL — SIGNIFICANT CHANGE UP (ref 150–400)
POTASSIUM SERPL-MCNC: 4 MMOL/L — SIGNIFICANT CHANGE UP (ref 3.5–5.3)
POTASSIUM SERPL-SCNC: 4 MMOL/L — SIGNIFICANT CHANGE UP (ref 3.5–5.3)
PROT SERPL-MCNC: 7.1 G/DL — SIGNIFICANT CHANGE UP (ref 6.6–8.7)
PROT UR-MCNC: 30 MG/DL
RBC # BLD: 4.07 M/UL — SIGNIFICANT CHANGE UP (ref 3.8–5.2)
RBC # FLD: 13 % — SIGNIFICANT CHANGE UP (ref 10.3–14.5)
RBC CASTS # UR COMP ASSIST: SIGNIFICANT CHANGE UP /HPF (ref 0–4)
SODIUM SERPL-SCNC: 143 MMOL/L — SIGNIFICANT CHANGE UP (ref 135–145)
SP GR SPEC: 1.02 — SIGNIFICANT CHANGE UP (ref 1.01–1.02)
UROBILINOGEN FLD QL: NEGATIVE MG/DL — SIGNIFICANT CHANGE UP
WBC # BLD: 13.03 K/UL — HIGH (ref 3.8–10.5)
WBC # FLD AUTO: 13.03 K/UL — HIGH (ref 3.8–10.5)
WBC UR QL: NEGATIVE — SIGNIFICANT CHANGE UP

## 2019-08-09 PROCEDURE — 99284 EMERGENCY DEPT VISIT MOD MDM: CPT

## 2019-08-09 PROCEDURE — 71045 X-RAY EXAM CHEST 1 VIEW: CPT | Mod: 26

## 2019-08-09 RX ORDER — PANTOPRAZOLE SODIUM 20 MG/1
80 TABLET, DELAYED RELEASE ORAL ONCE
Refills: 0 | Status: COMPLETED | OUTPATIENT
Start: 2019-08-09 | End: 2019-08-09

## 2019-08-09 RX ORDER — SODIUM CHLORIDE 9 MG/ML
1000 INJECTION INTRAMUSCULAR; INTRAVENOUS; SUBCUTANEOUS ONCE
Refills: 0 | Status: COMPLETED | OUTPATIENT
Start: 2019-08-09 | End: 2019-08-09

## 2019-08-09 RX ORDER — ACETAMINOPHEN 500 MG
650 TABLET ORAL ONCE
Refills: 0 | Status: COMPLETED | OUTPATIENT
Start: 2019-08-09 | End: 2019-08-09

## 2019-08-09 RX ADMIN — SODIUM CHLORIDE 1000 MILLILITER(S): 9 INJECTION INTRAMUSCULAR; INTRAVENOUS; SUBCUTANEOUS at 22:36

## 2019-08-09 RX ADMIN — SODIUM CHLORIDE 1000 MILLILITER(S): 9 INJECTION INTRAMUSCULAR; INTRAVENOUS; SUBCUTANEOUS at 21:36

## 2019-08-09 RX ADMIN — PANTOPRAZOLE SODIUM 80 MILLIGRAM(S): 20 TABLET, DELAYED RELEASE ORAL at 17:39

## 2019-08-09 RX ADMIN — Medication 650 MILLIGRAM(S): at 20:41

## 2019-08-09 NOTE — ED ADULT NURSE REASSESSMENT NOTE - NS ED NURSE REASSESS COMMENT FT1
Pt attempted to get out of bed despite side rails multiple times.  Pt became aggressive and required redirection.  pt required 2 person assist to stand and take just a couple of steps.  Placed on commode.  Urinated without difficulty and placed back into bed with side rails.  A few minutes later pt was dangling off of end of bed.  pt placed on 1:1 observation for safety.

## 2019-08-09 NOTE — ED ADULT NURSE NOTE - CHIEF COMPLAINT QUOTE
Patient arrived via EMS from [facility], awake alert, baseline mental status, breathing unlabored.  As per EMS, patient started to vomited approx  1 hour ago black emesis.  Patient has no complaints at this time.

## 2019-08-09 NOTE — ED PROVIDER NOTE - PROGRESS NOTE DETAILS
Pt signed out to me by Dr. Au pending CXR. CXR negative for pna. will check CT as patient has fever and was vomiting.  Pt unable to consent but patient requires CT with IV contrast without consent to evaluate for emergent cause of vomiting and fever. Pt CT negative. afebrile.  will d/c back to assisted living.

## 2019-08-09 NOTE — ED ADULT NURSE NOTE - OBJECTIVE STATEMENT
assumed pt care at 1640.  Pt from assisted living alert to person but not place or time.  Hx of senile dementia unable to provide history of why she is here. [facility] transfer sheet states "Vomiting dark substance.  Abdomen  soft, nondistended nontender.  Moves all four extremities without difficulty.  Changed into yellow gown for safety.

## 2019-08-09 NOTE — ED PROVIDER NOTE - OBJECTIVE STATEMENT
Patient with PMH anemia, HLD, COPD, depression, hypothyroidism, DM,  who is DNR presents from the [facility] for vomiting a dark substance today. Per history written from the [facility], she has a history of GI bleeding, is also on iron. Patient has no complaints at this time.

## 2019-08-09 NOTE — ED ADULT NURSE NOTE - INTERVENTIONS DEFINITIONS
Non-slip footwear when patient is off stretcher/Provide visual clues: red socks/Provide visual cue, wrist band, yellow gown, etc./Stretcher in lowest position, wheels locked, appropriate side rails in place

## 2019-08-09 NOTE — ED PROVIDER NOTE - PHYSICAL EXAMINATION
Const: Awake, alert and oriented x 1. In no acute distress. Well appearing.  HEENT: NC/AT. Moist mucous membranes.  Eyes: No scleral icterus. EOMI.  Neck:. Soft and supple. Full ROM without pain.  Cardiac: Regular rate and regular rhythm. +S1/S2. No murmurs. Peripheral pulses 2+ and symmetric. No LE edema.  Resp: Speaking in full sentences. No evidence of respiratory distress. No wheezes, rales or rhonchi.  Abd: Soft, non-tender, non-distended. Normal bowel sounds in all 4 quadrants. No guarding or rebound.  Rectal: Superficial skin break down 2 cm x 2 cm bilateral buttock. Dark greenish stool in underwear. Collapsed hemorrhoid at 12 o'clock.  Back: Spine midline and non-tender. No CVAT.  Skin: No rashes, abrasions or lacerations.  Neuro: Awake, alert & oriented x 1. Moves all extremities symmetrically. Speaking non-sensically, but follows commands well.

## 2019-08-09 NOTE — ED PROVIDER NOTE - CLINICAL SUMMARY MEDICAL DECISION MAKING FREE TEXT BOX
Patient with history of ulcers presents from the [facility] after she vomited dark colored vomitus. Patient has no complaints and cannot recall the events. Dried coffee ground vomitus on face, dark greenish stool in diaper. Will check fecal occult and labs, give protonix and reassess.

## 2019-08-09 NOTE — ED ADULT NURSE REASSESSMENT NOTE - NS ED NURSE REASSESS COMMENT FT1
Assumed care at this time. Patient in no apparent distress. Denies any pain at this time. Resting comfortably in stretcher. Awaiting radiology. 1:1 at bedside for safety.

## 2019-08-09 NOTE — ED ADULT TRIAGE NOTE - CHIEF COMPLAINT QUOTE
Patient arrived via EMS from [facility], awake alert, baseline mental status, breathing unlabored.  As per EMS, patient started to vomited approx  1 hour ago black emesis.  Patient has no complaints at this time.

## 2019-08-09 NOTE — ED ADULT NURSE REASSESSMENT NOTE - NS ED NURSE REASSESS COMMENT FT1
Pt resting comfortably with no episodes of vomiting since arrival.  Family at bedside. Side rails up.

## 2019-08-10 VITALS
RESPIRATION RATE: 18 BRPM | SYSTOLIC BLOOD PRESSURE: 93 MMHG | DIASTOLIC BLOOD PRESSURE: 50 MMHG | OXYGEN SATURATION: 93 % | HEART RATE: 70 BPM

## 2019-08-10 PROCEDURE — 71045 X-RAY EXAM CHEST 1 VIEW: CPT

## 2019-08-10 PROCEDURE — 74177 CT ABD & PELVIS W/CONTRAST: CPT | Mod: 26

## 2019-08-10 PROCEDURE — 36415 COLL VENOUS BLD VENIPUNCTURE: CPT

## 2019-08-10 PROCEDURE — 80053 COMPREHEN METABOLIC PANEL: CPT

## 2019-08-10 PROCEDURE — 87086 URINE CULTURE/COLONY COUNT: CPT

## 2019-08-10 PROCEDURE — 86900 BLOOD TYPING SEROLOGIC ABO: CPT

## 2019-08-10 PROCEDURE — 86901 BLOOD TYPING SEROLOGIC RH(D): CPT

## 2019-08-10 PROCEDURE — 85027 COMPLETE CBC AUTOMATED: CPT

## 2019-08-10 PROCEDURE — 99284 EMERGENCY DEPT VISIT MOD MDM: CPT | Mod: 25

## 2019-08-10 PROCEDURE — 96374 THER/PROPH/DIAG INJ IV PUSH: CPT | Mod: XU

## 2019-08-10 PROCEDURE — 82272 OCCULT BLD FECES 1-3 TESTS: CPT

## 2019-08-10 PROCEDURE — 74177 CT ABD & PELVIS W/CONTRAST: CPT

## 2019-08-10 PROCEDURE — 81001 URINALYSIS AUTO W/SCOPE: CPT

## 2019-08-10 PROCEDURE — 93005 ELECTROCARDIOGRAM TRACING: CPT

## 2019-08-10 PROCEDURE — 93010 ELECTROCARDIOGRAM REPORT: CPT

## 2019-08-10 PROCEDURE — 86850 RBC ANTIBODY SCREEN: CPT

## 2019-08-10 PROCEDURE — 96361 HYDRATE IV INFUSION ADD-ON: CPT

## 2019-08-11 LAB
CULTURE RESULTS: NO GROWTH — SIGNIFICANT CHANGE UP
SPECIMEN SOURCE: SIGNIFICANT CHANGE UP

## 2019-09-19 NOTE — ED ADULT NURSE NOTE - NSSISCREENINGQ1_ED_A_ED
"NAVIGATE Family Peer Support  A Part of the [facility] First Episode of Psychosis Program     Patient Name: Janey Givens  /Age:  1997 (22 year old)  Date of Encounter: 19  Length of Contact: 0    This writer reached out to offer resources and support to patient's mother, Billie, at NAVIGATE team's request.     No voicemail was left because an error message said \"not able to connect this call.\"  Phone number was verified and another attempt will be made.    [facility]iallo [facility]  NAVIGATE Family Peer    [Billing Code 89358 for No Billable Service as family peer support is a nonbillable service]    "
No

## 2019-09-30 ENCOUNTER — INPATIENT (INPATIENT)
Facility: HOSPITAL | Age: 84
LOS: 3 days | Discharge: NURSING, REHAB OR LONG-TERM CARE | DRG: 812 | End: 2019-10-04
Attending: FAMILY MEDICINE | Admitting: INTERNAL MEDICINE
Payer: MEDICARE

## 2019-09-30 VITALS
RESPIRATION RATE: 17 BRPM | SYSTOLIC BLOOD PRESSURE: 131 MMHG | WEIGHT: 160.06 LBS | TEMPERATURE: 98 F | OXYGEN SATURATION: 98 % | HEART RATE: 86 BPM | DIASTOLIC BLOOD PRESSURE: 75 MMHG

## 2019-09-30 DIAGNOSIS — K92.2 GASTROINTESTINAL HEMORRHAGE, UNSPECIFIED: ICD-10-CM

## 2019-09-30 LAB
ALBUMIN SERPL ELPH-MCNC: 3.8 G/DL — SIGNIFICANT CHANGE UP (ref 3.3–5.2)
ALP SERPL-CCNC: 105 U/L — SIGNIFICANT CHANGE UP (ref 40–120)
ALT FLD-CCNC: 13 U/L — SIGNIFICANT CHANGE UP
ANION GAP SERPL CALC-SCNC: 13 MMOL/L — SIGNIFICANT CHANGE UP (ref 5–17)
APTT BLD: 25.5 SEC — LOW (ref 27.5–36.3)
AST SERPL-CCNC: 24 U/L — SIGNIFICANT CHANGE UP
BASOPHILS # BLD AUTO: 0.04 K/UL — SIGNIFICANT CHANGE UP (ref 0–0.2)
BASOPHILS NFR BLD AUTO: 0.5 % — SIGNIFICANT CHANGE UP (ref 0–2)
BILIRUB SERPL-MCNC: <0.2 MG/DL — LOW (ref 0.4–2)
BLD GP AB SCN SERPL QL: SIGNIFICANT CHANGE UP
BUN SERPL-MCNC: 14 MG/DL — SIGNIFICANT CHANGE UP (ref 8–20)
CALCIUM SERPL-MCNC: 9.1 MG/DL — SIGNIFICANT CHANGE UP (ref 8.6–10.2)
CHLORIDE SERPL-SCNC: 98 MMOL/L — SIGNIFICANT CHANGE UP (ref 98–107)
CO2 SERPL-SCNC: 33 MMOL/L — HIGH (ref 22–29)
CREAT SERPL-MCNC: 0.87 MG/DL — SIGNIFICANT CHANGE UP (ref 0.5–1.3)
EOSINOPHIL # BLD AUTO: 0.05 K/UL — SIGNIFICANT CHANGE UP (ref 0–0.5)
EOSINOPHIL NFR BLD AUTO: 0.6 % — SIGNIFICANT CHANGE UP (ref 0–6)
GLUCOSE SERPL-MCNC: 179 MG/DL — HIGH (ref 70–115)
HCT VFR BLD CALC: 32.1 % — LOW (ref 34.5–45)
HGB BLD-MCNC: 9.7 G/DL — LOW (ref 11.5–15.5)
IMM GRANULOCYTES NFR BLD AUTO: 0.2 % — SIGNIFICANT CHANGE UP (ref 0–1.5)
INR BLD: 1 RATIO — SIGNIFICANT CHANGE UP (ref 0.88–1.16)
LIDOCAIN IGE QN: 51 U/L — SIGNIFICANT CHANGE UP (ref 22–51)
LYMPHOCYTES # BLD AUTO: 0.92 K/UL — LOW (ref 1–3.3)
LYMPHOCYTES # BLD AUTO: 10.8 % — LOW (ref 13–44)
MCHC RBC-ENTMCNC: 30.2 GM/DL — LOW (ref 32–36)
MCHC RBC-ENTMCNC: 30.4 PG — SIGNIFICANT CHANGE UP (ref 27–34)
MCV RBC AUTO: 100.6 FL — HIGH (ref 80–100)
MONOCYTES # BLD AUTO: 0.4 K/UL — SIGNIFICANT CHANGE UP (ref 0–0.9)
MONOCYTES NFR BLD AUTO: 4.7 % — SIGNIFICANT CHANGE UP (ref 2–14)
NEUTROPHILS # BLD AUTO: 7.07 K/UL — SIGNIFICANT CHANGE UP (ref 1.8–7.4)
NEUTROPHILS NFR BLD AUTO: 83.2 % — HIGH (ref 43–77)
PLATELET # BLD AUTO: 429 K/UL — HIGH (ref 150–400)
POTASSIUM SERPL-MCNC: 3.8 MMOL/L — SIGNIFICANT CHANGE UP (ref 3.5–5.3)
POTASSIUM SERPL-SCNC: 3.8 MMOL/L — SIGNIFICANT CHANGE UP (ref 3.5–5.3)
PROT SERPL-MCNC: 7.1 G/DL — SIGNIFICANT CHANGE UP (ref 6.6–8.7)
PROTHROM AB SERPL-ACNC: 11.5 SEC — SIGNIFICANT CHANGE UP (ref 10–12.9)
RBC # BLD: 3.19 M/UL — LOW (ref 3.8–5.2)
RBC # FLD: 12.9 % — SIGNIFICANT CHANGE UP (ref 10.3–14.5)
SODIUM SERPL-SCNC: 144 MMOL/L — SIGNIFICANT CHANGE UP (ref 135–145)
TROPONIN T SERPL-MCNC: <0.01 NG/ML — SIGNIFICANT CHANGE UP (ref 0–0.06)
WBC # BLD: 8.5 K/UL — SIGNIFICANT CHANGE UP (ref 3.8–10.5)
WBC # FLD AUTO: 8.5 K/UL — SIGNIFICANT CHANGE UP (ref 3.8–10.5)

## 2019-09-30 PROCEDURE — 71045 X-RAY EXAM CHEST 1 VIEW: CPT | Mod: 26

## 2019-09-30 PROCEDURE — 99223 1ST HOSP IP/OBS HIGH 75: CPT

## 2019-09-30 PROCEDURE — 93010 ELECTROCARDIOGRAM REPORT: CPT

## 2019-09-30 PROCEDURE — 99285 EMERGENCY DEPT VISIT HI MDM: CPT

## 2019-09-30 RX ORDER — DEXTROSE 50 % IN WATER 50 %
12.5 SYRINGE (ML) INTRAVENOUS ONCE
Refills: 0 | Status: DISCONTINUED | OUTPATIENT
Start: 2019-09-30 | End: 2019-10-04

## 2019-09-30 RX ORDER — SODIUM CHLORIDE 9 MG/ML
1000 INJECTION INTRAMUSCULAR; INTRAVENOUS; SUBCUTANEOUS ONCE
Refills: 0 | Status: COMPLETED | OUTPATIENT
Start: 2019-09-30 | End: 2019-09-30

## 2019-09-30 RX ORDER — GLUCAGON INJECTION, SOLUTION 0.5 MG/.1ML
1 INJECTION, SOLUTION SUBCUTANEOUS ONCE
Refills: 0 | Status: DISCONTINUED | OUTPATIENT
Start: 2019-09-30 | End: 2019-10-04

## 2019-09-30 RX ORDER — SODIUM CHLORIDE 9 MG/ML
1000 INJECTION, SOLUTION INTRAVENOUS
Refills: 0 | Status: DISCONTINUED | OUTPATIENT
Start: 2019-09-30 | End: 2019-10-04

## 2019-09-30 RX ORDER — LEVOTHYROXINE SODIUM 125 MCG
125 TABLET ORAL
Qty: 0 | Refills: 0 | DISCHARGE

## 2019-09-30 RX ORDER — MIRTAZAPINE 45 MG/1
7.5 TABLET, ORALLY DISINTEGRATING ORAL AT BEDTIME
Refills: 0 | Status: DISCONTINUED | OUTPATIENT
Start: 2019-09-30 | End: 2019-10-04

## 2019-09-30 RX ORDER — DEXTROSE 50 % IN WATER 50 %
25 SYRINGE (ML) INTRAVENOUS ONCE
Refills: 0 | Status: DISCONTINUED | OUTPATIENT
Start: 2019-09-30 | End: 2019-10-04

## 2019-09-30 RX ORDER — CITALOPRAM 10 MG/1
10 TABLET, FILM COATED ORAL AT BEDTIME
Refills: 0 | Status: DISCONTINUED | OUTPATIENT
Start: 2019-09-30 | End: 2019-10-04

## 2019-09-30 RX ORDER — PANTOPRAZOLE SODIUM 20 MG/1
8 TABLET, DELAYED RELEASE ORAL
Qty: 80 | Refills: 0 | Status: DISCONTINUED | OUTPATIENT
Start: 2019-09-30 | End: 2019-10-04

## 2019-09-30 RX ORDER — HALOPERIDOL DECANOATE 100 MG/ML
0.5 INJECTION INTRAMUSCULAR EVERY 8 HOURS
Refills: 0 | Status: DISCONTINUED | OUTPATIENT
Start: 2019-09-30 | End: 2019-10-04

## 2019-09-30 RX ORDER — PANTOPRAZOLE SODIUM 20 MG/1
80 TABLET, DELAYED RELEASE ORAL ONCE
Refills: 0 | Status: COMPLETED | OUTPATIENT
Start: 2019-09-30 | End: 2019-09-30

## 2019-09-30 RX ORDER — CITALOPRAM 10 MG/1
1 TABLET, FILM COATED ORAL
Qty: 0 | Refills: 0 | DISCHARGE

## 2019-09-30 RX ORDER — DEXTROSE 50 % IN WATER 50 %
15 SYRINGE (ML) INTRAVENOUS ONCE
Refills: 0 | Status: DISCONTINUED | OUTPATIENT
Start: 2019-09-30 | End: 2019-10-04

## 2019-09-30 RX ORDER — MIRTAZAPINE 45 MG/1
1 TABLET, ORALLY DISINTEGRATING ORAL
Qty: 0 | Refills: 0 | DISCHARGE

## 2019-09-30 RX ORDER — ONDANSETRON 8 MG/1
4 TABLET, FILM COATED ORAL ONCE
Refills: 0 | Status: COMPLETED | OUTPATIENT
Start: 2019-09-30 | End: 2019-09-30

## 2019-09-30 RX ORDER — RISPERIDONE 4 MG/1
0.5 TABLET ORAL
Qty: 0 | Refills: 0 | DISCHARGE

## 2019-09-30 RX ADMIN — ONDANSETRON 4 MILLIGRAM(S): 8 TABLET, FILM COATED ORAL at 16:50

## 2019-09-30 RX ADMIN — HALOPERIDOL DECANOATE 0.5 MILLIGRAM(S): 100 INJECTION INTRAMUSCULAR at 22:25

## 2019-09-30 RX ADMIN — PANTOPRAZOLE SODIUM 80 MILLIGRAM(S): 20 TABLET, DELAYED RELEASE ORAL at 16:41

## 2019-09-30 RX ADMIN — PANTOPRAZOLE SODIUM 10 MG/HR: 20 TABLET, DELAYED RELEASE ORAL at 20:12

## 2019-09-30 RX ADMIN — SODIUM CHLORIDE 1000 MILLILITER(S): 9 INJECTION INTRAMUSCULAR; INTRAVENOUS; SUBCUTANEOUS at 17:45

## 2019-09-30 NOTE — ED PROVIDER NOTE - CLINICAL SUMMARY MEDICAL DECISION MAKING FREE TEXT BOX
89yo F hx dementia, prior gi bleed presenting with coffee ground emesis starting at 2pm. DNR/DNI/No Fluids per MOLST. Labs, protonix. Family on the way to the hospital per son of patient to further discuss patient's wishes. 89yo F hx dementia, prior gi bleed presenting with coffee ground emesis starting at 2pm. DNR/DNI/No Fluids/antibiotics per MOLST.comfort measures only. Labs, protonix. Family on the way to the hospital per son of patient to further discuss patient's wishes.

## 2019-09-30 NOTE — H&P ADULT - HISTORY OF PRESENT ILLNESS
89 y.o. F from [facility] with hx of depression, HLD, hypothyroidism, anemia, severe dementia, prior coffee ground emesis 2/2 UGIB on 1/2018 with imaging at that time showing a large intra-thoracic stomach within a hiatal hernia; pt was transfused 2 u prbc at that time and no GI intervention was desired by the family. Currently the pt presents with recurrent coffee ground emesis throughout the day.  Nurse described pt as being covered with coffee grounds and vomiting about 500 cc into an emesis basin. As per ER physician's note although MOLST in the chart and pt has DNR status, family agreed with fluid and blood product resuscitation. As per paper work pt is not on any blood thinners/ has not used NSAIDS. Unable to obtain hx/ROS from the patient given severe dementia, was becoming more combative with staff.

## 2019-09-30 NOTE — H&P ADULT - NSICDXPASTMEDICALHX_GEN_ALL_CORE_FT
PAST MEDICAL HISTORY:  Anemia     COPD (chronic obstructive pulmonary disease)     Dementia     Hyperlipemia     Hypothyroidism (acquired)

## 2019-09-30 NOTE — ED ADULT NURSE NOTE - OBJECTIVE STATEMENT
pt BIBA confused, from [facility] home due to coffee ground emesis since 1400. respirations even unlabored. pt pallor in color. pt brought to critical care. pt educated on plan of care, pt able to successfully teach back plan of care to RN, RN will continue to reeducate pt during hospital stay.

## 2019-09-30 NOTE — ED ADULT NURSE REASSESSMENT NOTE - NS ED NURSE REASSESS COMMENT FT1
pt resting in bed eyes closed, pt denies pain, alert and oriented x1 PRBC andprotonix drip running. pt educated on plan of care, pt able to successfully teach back plan of care to RN, RN will continue to reeducate pt during hospital stay.

## 2019-09-30 NOTE — ED ADULT NURSE NOTE - CHIEF COMPLAINT QUOTE
Patient BIBA from [facility]. Per EMS patient has been vomiting coffee ground emesis since 2pm. patient has a hx of GI bleeding. Patient is not on any blood thinners. Patient has hx of dementia and is a DNR/DNI

## 2019-09-30 NOTE — H&P ADULT - ASSESSMENT
89 y.o. F from [facility] with hx of depression, HLD, hypothyroidism, anemia, severe dementia, prior coffee ground emesis 2/2 UGIB on 1/2018 with imaging at that time showing a large intra-thoracic stomach within a hiatal hernia; pt was transfused 2 u prbc at that time and no GI intervention was desired by the family. Currently the pt presents with recurrent coffee ground emesis, noted ct abd/pelvis imaging with nearly intrathoracic stomach suspected ischemic segment of the stomach from either volvulus or non- reduceable gastric hernia, with vascular compromise. GI consulted for possible EGD, but after extensive d/w HCP Mabel (daughter) opted for no further aggressive measures/ procedures and to c/w supportive care with fluids/ blood products. She wants comfort care/ palliative care involvement.      Coffee ground emesis 2/2 UGIB   - suspected volvulus/ non reducible gastric hernia   - c/w serial abdominal exams  - hgb dropped from 13 (Aug 2019) to 9.7   - Will maintain hgb >8   - c/w zofran prn   - c/w IVF  - ER gave the pt 2 u prbc   - c/w monitoring h/h   - repeat in am   - Blood consent in the chart  - GI consult noted and appreciated  - Extensive d/w the daughter Mabel and she would not want the patient to proceed with EGD or Sx, she feels any form of anesthesia or aggressive measures would just cause her further agitation/ frustration. She wants to proceed with supportive care and comfort care. Is in agreement with palliative care consultation.       DVT ppx  - No chemical AC given GIB  - c/w scd boots b/l     Advanced Directive: DNR/DNI   Next of kin: Mabel (daughter) and Rashad (son)  As per daughter Mabel, her and her brother share the HCP, as of right now her brother just under went sx and so it is hard for him to help in the decision making process. Initially she did want her mother to receive supportive care with IVF and blood products. After discussion in regards to need for possible EGD or even sx, she stated that at her mothers age and given her co morbid severe dementia she would do very poorly with these procedures. She stated she would want supportive care and comfort measures. She would not want her mother receiving any type of procedure and is interested in palliative care. She has had a good experience with palliation in the past with her father. SONYA completed prior in 2018, DNR/DNI.   Advanced care planning took 32 minutes     Dispo: PT consulted, will need evaluation to return to [facility] on discharge. 89 y.o. F from [facility] with hx of depression, HLD, hypothyroidism, anemia, severe dementia, prior coffee ground emesis 2/2 UGIB on 1/2018 with imaging at that time showing a large intra-thoracic stomach within a hiatal hernia; pt was transfused 2 u prbc at that time and no GI intervention was desired by the family. Currently the pt presents with recurrent coffee ground emesis, noted ct abd/pelvis imaging with nearly intrathoracic stomach suspected ischemic segment of the stomach from either volvulus or non- reduceable gastric hernia, with vascular compromise. GI consulted for possible EGD, but after extensive d/w HCP Mabel (daughter) opted for no further aggressive measures/ procedures and to c/w supportive care with fluids/ blood products. She wants comfort care/ palliative care involvement.      Coffee ground emesis 2/2 UGIB   - Acute blood loss anemia   - suspected volvulus/ non reducible gastric hernia   - c/w serial abdominal exams  - hgb dropped from 13 (Aug 2019) to 9.7   - Will maintain hgb >8   - c/w zofran prn   - c/w IVF  - ER gave the pt 2 u prbc   - c/w monitoring h/h   - repeat in am   - Blood consent in the chart  - GI consult noted and appreciated  - Extensive d/w the daughter Mabel and she would not want the patient to proceed with EGD or Sx, she feels any form of anesthesia or aggressive measures would just cause her further agitation/ frustration. She wants to proceed with supportive care and comfort care. Is in agreement with palliative care consultation.     Severe dementia with behavioral disturbances  - pt is sun downing, becoming agitated/ frustrated and combative with staff  - c/w low dose haldol IV prn   - c/w donepezil po qhs   - c/w supportive care  - 1:1 observation for safety/ relaxation techniques; avoiding restraints     Depression   - c/w mirtazapine po qhs  - c/w celexa po qd     HTN  - bp was normal on admission now down trending with hypotension   - hold lasix and potassium chloride for now     Hypothyroid  - c/w synthroid 50mcg IV QD  (decreasing dose by half)     DVT ppx  - No chemical AC given GIB  - c/w scd boots b/l     Advanced Directive: DNR/DNI   Next of kin: Mabel (daughter) and Rashad (son)  As per daughter Mabel, her and her brother share the HCP, as of right now her brother just under went sx and so it is hard for him to help in the decision making process. Initially she did want her mother to receive supportive care with IVF and blood products. After discussion in regards to need for possible EGD or even sx, she stated that at her mothers age and given her co morbid severe dementia she would do very poorly with these procedures. She stated she would want supportive care and comfort measures. She would not want her mother receiving any type of procedure and is interested in palliative care. She has had a good experience with palliation in the past with her father. SONYA completed prior in 2018, DNR/DNI.   Advanced care planning took 32 minutes     Dispo: Pt is from the [facility], currently with guarded prognosis, family opting for comfort measures. Anticipated LOS unclear; pending further discussions, likely hospice intervention. 89 y.o. F from [facility] with hx of depression, HLD, hypothyroidism, anemia, severe dementia, prior coffee ground emesis 2/2 UGIB on 1/2018 with imaging at that time showing a large intra-thoracic stomach within a hiatal hernia; pt was transfused 2 u prbc at that time and no GI intervention was desired by the family. Currently the pt presents with recurrent coffee ground emesis, noted ct abd/pelvis imaging with nearly intrathoracic stomach suspected ischemic segment of the stomach from either volvulus or non- reduceable gastric hernia, with vascular compromise. GI consulted for possible EGD, but after extensive d/w HCP Mabel (daughter) opted for no further aggressive measures/ procedures and to c/w supportive care with fluids/ blood products. She wants comfort care/ palliative care involvement.      Coffee ground emesis 2/2 UGIB   - Acute blood loss anemia   - suspected volvulus/ non reducible gastric hernia   - c/w serial abdominal exams  - hgb dropped from 13 (Aug 2019) to 9.7   - Will maintain hgb >8   - c/w zofran prn   - c/w PPI infusion   - c/w IVF   -accuchecks ACHS tid    - hypoglycemia protocol   - NPO   - ER gave the pt 2 u prbc   - c/w monitoring h/h   - repeat in am   - Blood consent in the chart  - pending official ct abd/pelvis reading   - GI consult noted and appreciated  - Extensive d/w the daughter Mabel and she would not want the patient to proceed with EGD or Sx, she feels any form of anesthesia or aggressive measures would just cause her further agitation/ frustration. She wants to proceed with supportive care and comfort care. Is in agreement with palliative care consultation and further discussions in regards to comfort measures.     Severe dementia with behavioral disturbances  - pt is sun downing, becoming agitated/ frustrated and combative with staff  - c/w low dose haldol IV prn   - c/w donepezil po qhs   - c/w supportive care  - 1:1 observation for safety/ relaxation techniques; avoiding restraints     Depression   - c/w mirtazapine po qhs  - c/w celexa po qd     HTN  - bp was normal on admission now down trending with hypotension   - hold lasix and potassium chloride for now     Hypothyroid  - c/w synthroid 50mcg IV QD  (decreasing dose by half)     DVT ppx  - No chemical AC given GIB  - c/w scd boots b/l     Advanced Directive: DNR/DNI   Next of kin: Mabel (daughter) and Rashad (son)  As per daughter Mabel, her and her brother share the HCP, as of right now her brother just under went sx and so it is hard for him to help in the decision making process. Initially she did want her mother to receive supportive care with IVF and blood products. After discussion in regards to need for possible EGD or even sx, she stated that at her mothers age and given her co morbid severe dementia she would do very poorly with these procedures. She stated she would want supportive care and comfort measures. She would not want her mother receiving any type of procedure and is interested in palliative care. She has had a good experience with palliation in the past with her father. SONYA completed prior in 2018, DNR/DNI.   Advanced care planning took 32 minutes     Dispo: Pt is from the [facility], currently with guarded prognosis, family opting for comfort measures. Anticipated LOS unclear; pending further discussions, likely hospice intervention.

## 2019-09-30 NOTE — ED PROVIDER NOTE - ATTENDING CONTRIBUTION TO CARE
I, Ely Owens, have personally seen and examined this patient. I have fully participated in the care of this patient. I have reviewed all pertinent clinical information, including history, physical exam, plan and the Resident's note and agree except as noted below.     Pt with DNR/DNI MOLST with comfort measures only. hj/o GI bleed not on A/C sent in for coffee ground emesis. patient in no distress. abd soft, NT/ND. pale. VS stable. will contact family regarding her care. for now will honor MOLST. labs, protonix. reasses

## 2019-09-30 NOTE — ED ADULT TRIAGE NOTE - CHIEF COMPLAINT QUOTE
Patient BIBA from [facility]. Per EMS patient has been vomiting coffee ground emesis since 2pm. patient has a hx of GI bleeding. Patient is not on any blood thinners. Patient has hx of dementia and is a DNR/DNI

## 2019-09-30 NOTE — CONSULT NOTE ADULT - SUBJECTIVE AND OBJECTIVE BOX
HPI:  89 yo WF with OBS, depression, HLD, hypothyroidism, anemia.  Resident at [facility] assisted living in their reflections unit.  Known to have had prior UGI bleeding with imaging showing a large intra-thoracic stomach within a HH.  Last admitted in 1/ 2018 for a similar presentation.  Transfused 2 units of blood and bleeding ceased spontaneously.  No GI investigations were desired by the Family.  Currently referred for recurrent coffee ground vomitng throughout the day.  Nurse described pt as being covered with coffee grounds andvomiting about 500 cc into an emesis basin.  DNR status.  MOLST forms available.  Family agreed to fluid and blood product resuscitation.  IVF and PPI drip and PRBC's 2 units requested.  Pt has severe dementia.  No coherent hx obtainable from patient.  Does not currently appear in distress.    Denies chest pain, SOB, abd pain or distention.  No fever.  No reports of melena.    Med review:  No aspirin or NSAIDs.  On Pantoprazole 40 mg po qd.  No anticoagulants or anti-platelet medications.    Hemodynamically stable.           PAST MEDICAL & SURGICAL HISTORY:  COPD (chronic obstructive pulmonary disease)  Hyperlipemia  Hypothyroidism (acquired)  Dementia;  Depression  Anemia  No significant past surgical history      ROS:  No Heartburn, regurgitation, dysphagia, odynophagia.  No dyspepsia  No abdominal pain.    No Nausea, vomiting.  No Bleeding.  No hematemesis.   No diarrhea.    No hematochesia.  No weight loss, anorexia.  No edema.      MEDICATIONS  (STANDING):  pantoprazole Infusion 8 mG/Hr (10 mL/Hr) IV Continuous,   Zofran.      MEDICATIONS  PTA:  Colace;  Ferrous sulfate; L thyroxine; Respiradol; Protonix; Lasix; KCL, Celexa; Aricept; MVI.        Allergies  No Known Allergies    SOCIAL HISTORY:NA.      FAMILY HISTORY:  No pertinent family history in first degree relatives      Vital Signs Last 24 Hrs  T(C): 36.6 (30 Sep 2019 16:08), Max: 36.6 (30 Sep 2019 16:08)  T(F): 97.8 (30 Sep 2019 16:08), Max: 97.8 (30 Sep 2019 16:08)  HR: 88 (30 Sep 2019 19:55) (76 - 88)  BP: 92/52 (30 Sep 2019 19:55) (92/52 - 131/75)  BP(mean): --  RR: 18 (30 Sep 2019 19:55) (17 - 18)  SpO2: 97% (30 Sep 2019 19:55) (94% - 98%)    PHYSICAL EXAM:    GENERAL: NAD, well-groomed, well-developed  HEAD:  Atraumatic, Normocephalic  EYES: EOMI, PERRLA, conjunctiva and sclera clear  ENMT: No tonsillar erythema, exudates, or enlargement; Moist mucous membranes, Good dentition, No lesions  NECK: Supple, No JVD, Normal thyroid  CHEST/LUNG: Clear to percussion bilaterally; No rales, rhonchi, wheezing, or rubs  HEART: Regular rate and rhythm; No murmurs, rubs, or gallops  ABDOMEN: Soft, Nontender, Nondistended; Bowel sounds present;  No distention.  No HSM.  No MTR.  No REJI.      EXTREMITIES:  2+ Peripheral Pulses, No clubbing, cyanosis, or edema  LYMPH: No lymphadenopathy noted  SKIN: No rashes or lesions      LABS:                        9.7    8.50  )-----------( 429      ( 30 Sep 2019 16:58 )             32.1     09-30    144  |  98  |  14.0  ----------------------------<  179<H>  3.8   |  33.0<H>  |  0.87    Ca    9.1      30 Sep 2019 16:58    TPro  7.1  /  Alb  3.8  /  TBili  <0.2<L>  /  DBili  x   /  AST  24  /  ALT  13  /  AlkPhos  105  09-30    PT/INR - ( 30 Sep 2019 16:58 )   PT: 11.5 sec;   INR: 1.00 ratio         PTT - ( 30 Sep 2019 16:58 )  PTT:25.5 sec       LIVER FUNCTIONS - ( 30 Sep 2019 16:58 )  Alb: 3.8 g/dL / Pro: 7.1 g/dL / ALK PHOS: 105 U/L / ALT: 13 U/L / AST: 24 U/L / GGT: x           EKG:  prior:  NSR,  NSSTTW changes.      RADIOLOGY & ADDITIONAL STUDIES:  CT A/P: Intrathoracic gastric herniation, posterior and lateral too the heart.  8/2019  CXR:  Large HH.  8/2019.

## 2019-09-30 NOTE — ED PROVIDER NOTE - PHYSICAL EXAMINATION
Gen: vomiting coffee ground emesis  HENT: Normocephalic, atraumatic.   Eyes: PERRL, EOMI    Resp: CTAB, non-labored, no wheeze  CV: rrr, no murmur  Abd: soft, NTND, no rebound or guarding  Rectal: No hemorrhoids, no stool with digital exam  MSK: No CVAT bilaterally, moving all extremities  Skin: warm and dry  Neuro: AOx1, unable to give history, follows commands, alert Gen: +coffee ground emesis, airway intact, no respiratory distress  HENT: Normocephalic, atraumatic.   Eyes: PERRL, EOMI, pale conjunctiva  Resp: CTAB, non-labored, no wheeze  CV: rrr, no murmur  Abd: soft, NTND, no rebound or guarding  Rectal: No hemorrhoids, no stool with digital exam  MSK: No CVAT bilaterally, moving all extremities  Skin: warm and dry  Neuro: AOx1, unable to give history, follows commands, alert

## 2019-09-30 NOTE — ED ADULT NURSE REASSESSMENT NOTE - NS ED NURSE REASSESS COMMENT FT1
Pt received from [facility] , pt appears in no distress at this time, VSS , awaiting family arrival and instruction for further treatment, As per MOLTS form - no iv hydration , awaiting further order, will continue to monitor

## 2019-09-30 NOTE — ED PROVIDER NOTE - OBJECTIVE STATEMENT
89yo F hx dementia, gerd, prior gi bleed presenting with coffee ground emesis starting earlier this afternoon about 2pm per EMS. [facility], aox1 at baseline per EMS. Per chart review, not taking any blood thinners. Patient actively vomiting coffee ground emesis, aox1, unable to give history. No complaints from patient. MOLST form reviewed, DNR/DNI, and no fluids to be given. Called son, who said daughter is on the way and he didn't know enough to make any changes. 89yo F hx dementia, gerd, prior gi bleed presenting with coffee ground emesis starting earlier this afternoon about 2pm per EMS. Patient coming from the reflections unit at the [facility], aox1 at baseline per EMS. Per chart review, not taking any blood thinners. Patient actively vomiting coffee ground emesis, minimal, aox1, unable to give history. No complaints from patient. MOLST form reviewed, DNR/DNI, and no fluids to be given. Called son, who said daughter is on the way and he didn't know enough to make any changes.

## 2019-09-30 NOTE — ED ADULT TRIAGE NOTE - TEMPERATURE IN CELSIUS (DEGREES C)
----- Message from Chary Merchant sent at 2019  4:18 PM CDT -----  Contact: PTs Father  Father is needing an appointment for new born baby   PT's siblings are current patients of [facility]    Circumcision is already scheduled - I won't cancel unless it's ok to keep    Callback: [phone] or [phone]  
Appt made siblings see Dr. valencia  
36.6

## 2019-09-30 NOTE — ED PROVIDER NOTE - PROGRESS NOTE DETAILS
AK: Had a discussion in person with the pt's daughter and the son who was on the phone at the time about further care. They would like fluids and blood products if the medical team thinks they are indicated. They acknowledge that the MOLST form says differently, and would like to proceed with fluids/blood anyways.

## 2019-10-01 DIAGNOSIS — Z51.5 ENCOUNTER FOR PALLIATIVE CARE: ICD-10-CM

## 2019-10-01 DIAGNOSIS — K92.2 GASTROINTESTINAL HEMORRHAGE, UNSPECIFIED: ICD-10-CM

## 2019-10-01 DIAGNOSIS — R53.81 OTHER MALAISE: ICD-10-CM

## 2019-10-01 DIAGNOSIS — F03.90 UNSPECIFIED DEMENTIA WITHOUT BEHAVIORAL DISTURBANCE: ICD-10-CM

## 2019-10-01 LAB
ALBUMIN SERPL ELPH-MCNC: 2.9 G/DL — LOW (ref 3.3–5.2)
ALP SERPL-CCNC: 79 U/L — SIGNIFICANT CHANGE UP (ref 40–120)
ALT FLD-CCNC: 9 U/L — SIGNIFICANT CHANGE UP
AMYLASE P1 CFR SERPL: 64 U/L — SIGNIFICANT CHANGE UP (ref 36–128)
ANION GAP SERPL CALC-SCNC: 9 MMOL/L — SIGNIFICANT CHANGE UP (ref 5–17)
AST SERPL-CCNC: 19 U/L — SIGNIFICANT CHANGE UP
BASOPHILS # BLD AUTO: 0.02 K/UL — SIGNIFICANT CHANGE UP (ref 0–0.2)
BASOPHILS NFR BLD AUTO: 0.3 % — SIGNIFICANT CHANGE UP (ref 0–2)
BILIRUB SERPL-MCNC: 0.5 MG/DL — SIGNIFICANT CHANGE UP (ref 0.4–2)
BUN SERPL-MCNC: 21 MG/DL — HIGH (ref 8–20)
CALCIUM SERPL-MCNC: 8 MG/DL — LOW (ref 8.6–10.2)
CHLORIDE SERPL-SCNC: 108 MMOL/L — HIGH (ref 98–107)
CO2 SERPL-SCNC: 25 MMOL/L — SIGNIFICANT CHANGE UP (ref 22–29)
CREAT SERPL-MCNC: 0.79 MG/DL — SIGNIFICANT CHANGE UP (ref 0.5–1.3)
EOSINOPHIL # BLD AUTO: 0.11 K/UL — SIGNIFICANT CHANGE UP (ref 0–0.5)
EOSINOPHIL NFR BLD AUTO: 1.5 % — SIGNIFICANT CHANGE UP (ref 0–6)
GLUCOSE BLDC GLUCOMTR-MCNC: 95 MG/DL — SIGNIFICANT CHANGE UP (ref 70–99)
GLUCOSE SERPL-MCNC: 98 MG/DL — SIGNIFICANT CHANGE UP (ref 70–115)
HCT VFR BLD CALC: 32.8 % — LOW (ref 34.5–45)
HGB BLD-MCNC: 10.4 G/DL — LOW (ref 11.5–15.5)
IMM GRANULOCYTES NFR BLD AUTO: 0.4 % — SIGNIFICANT CHANGE UP (ref 0–1.5)
LACTATE SERPL-SCNC: 0.9 MMOL/L — SIGNIFICANT CHANGE UP (ref 0.5–2)
LYMPHOCYTES # BLD AUTO: 1.81 K/UL — SIGNIFICANT CHANGE UP (ref 1–3.3)
LYMPHOCYTES # BLD AUTO: 24.6 % — SIGNIFICANT CHANGE UP (ref 13–44)
MCHC RBC-ENTMCNC: 30.7 PG — SIGNIFICANT CHANGE UP (ref 27–34)
MCHC RBC-ENTMCNC: 31.7 GM/DL — LOW (ref 32–36)
MCV RBC AUTO: 96.8 FL — SIGNIFICANT CHANGE UP (ref 80–100)
MONOCYTES # BLD AUTO: 0.78 K/UL — SIGNIFICANT CHANGE UP (ref 0–0.9)
MONOCYTES NFR BLD AUTO: 10.6 % — SIGNIFICANT CHANGE UP (ref 2–14)
NEUTROPHILS # BLD AUTO: 4.62 K/UL — SIGNIFICANT CHANGE UP (ref 1.8–7.4)
NEUTROPHILS NFR BLD AUTO: 62.6 % — SIGNIFICANT CHANGE UP (ref 43–77)
PLATELET # BLD AUTO: 269 K/UL — SIGNIFICANT CHANGE UP (ref 150–400)
POTASSIUM SERPL-MCNC: 4.2 MMOL/L — SIGNIFICANT CHANGE UP (ref 3.5–5.3)
POTASSIUM SERPL-SCNC: 4.2 MMOL/L — SIGNIFICANT CHANGE UP (ref 3.5–5.3)
PROT SERPL-MCNC: 5.4 G/DL — LOW (ref 6.6–8.7)
RBC # BLD: 3.39 M/UL — LOW (ref 3.8–5.2)
RBC # FLD: 15 % — HIGH (ref 10.3–14.5)
SODIUM SERPL-SCNC: 142 MMOL/L — SIGNIFICANT CHANGE UP (ref 135–145)
WBC # BLD: 7.37 K/UL — SIGNIFICANT CHANGE UP (ref 3.8–10.5)
WBC # FLD AUTO: 7.37 K/UL — SIGNIFICANT CHANGE UP (ref 3.8–10.5)

## 2019-10-01 PROCEDURE — 99223 1ST HOSP IP/OBS HIGH 75: CPT

## 2019-10-01 PROCEDURE — 99232 SBSQ HOSP IP/OBS MODERATE 35: CPT

## 2019-10-01 PROCEDURE — 99222 1ST HOSP IP/OBS MODERATE 55: CPT

## 2019-10-01 PROCEDURE — 74176 CT ABD & PELVIS W/O CONTRAST: CPT | Mod: 26

## 2019-10-01 PROCEDURE — 99497 ADVNCD CARE PLAN 30 MIN: CPT | Mod: 25

## 2019-10-01 PROCEDURE — 12345: CPT | Mod: NC

## 2019-10-01 RX ORDER — LEVOTHYROXINE SODIUM 125 MCG
50 TABLET ORAL AT BEDTIME
Refills: 0 | Status: DISCONTINUED | OUTPATIENT
Start: 2019-10-01 | End: 2019-10-04

## 2019-10-01 RX ORDER — ONDANSETRON 8 MG/1
4 TABLET, FILM COATED ORAL EVERY 8 HOURS
Refills: 0 | Status: DISCONTINUED | OUTPATIENT
Start: 2019-10-01 | End: 2019-10-04

## 2019-10-01 RX ORDER — DONEPEZIL HYDROCHLORIDE 10 MG/1
10 TABLET, FILM COATED ORAL AT BEDTIME
Refills: 0 | Status: DISCONTINUED | OUTPATIENT
Start: 2019-10-01 | End: 2019-10-04

## 2019-10-01 RX ADMIN — PANTOPRAZOLE SODIUM 10 MG/HR: 20 TABLET, DELAYED RELEASE ORAL at 04:47

## 2019-10-01 RX ADMIN — HALOPERIDOL DECANOATE 0.5 MILLIGRAM(S): 100 INJECTION INTRAMUSCULAR at 19:42

## 2019-10-01 RX ADMIN — Medication 50 MICROGRAM(S): at 21:42

## 2019-10-01 RX ADMIN — SODIUM CHLORIDE 75 MILLILITER(S): 9 INJECTION, SOLUTION INTRAVENOUS at 04:47

## 2019-10-01 NOTE — PROGRESS NOTE ADULT - SUBJECTIVE AND OBJECTIVE BOX
cc: gib       interval hx: patient seen and eval. comfortable. cooperative. no acute distress. was agitated overnight. no fever or chills.     Vital Signs Last 24 Hrs  T(C): 36.6 (01 Oct 2019 08:39), Max: 37.1 (30 Sep 2019 23:35)  T(F): 97.9 (01 Oct 2019 08:39), Max: 98.8 (30 Sep 2019 23:35)  HR: 66 (01 Oct 2019 08:39) (66 - 86)  BP: 99/46 (01 Oct 2019 08:39) (97/51 - 115/67)  BP(mean): --  RR: 16 (01 Oct 2019 08:39) (16 - 20)  SpO2: 95% (01 Oct 2019 02:45) (93% - 95%)    gen: awake , cooperative , in no acute distress, confused   HEENT : mm midly dry   lungs : clear b/l   cvs : s1s2, rrr  abd : soft , nontender , bs nl   ext : no edema   neuro: confused , awake, alert x 0-1 , movign all ext                           10.4   7.37  )-----------( 269      ( 01 Oct 2019 06:23 )             32.8   10-01    142  |  108<H>  |  21.0<H>  ----------------------------<  98  4.2   |  25.0  |  0.79    Ca    8.0<L>      01 Oct 2019 06:23    TPro  5.4<L>  /  Alb  2.9<L>  /  TBili  0.5  /  DBili  x   /  AST  19  /  ALT  9   /  AlkPhos  79  10-01

## 2019-10-01 NOTE — PROGRESS NOTE ADULT - ASSESSMENT
Resolved UGI bleeding.  Noted repeatedly that pt has a very large hiatus hernia with almost all of her stomach located within the left chest.  Doubt that pt has any issue with PUD.  Any ischemic bowel has resolved.  No point in pursuing any aggressive interventions.  Family happy with supportive care and no procedures.  MOLST has been re-instated.    Suggest Advance diet from NPO to clear liquids to regular.  Convert PPI to oral.  Maintain on chronic PPI for life.    Once diet tolerated GI cleared for return to Assisted Living.    Reconsult as needed.

## 2019-10-01 NOTE — CONSULT NOTE ADULT - PROBLEM SELECTOR RECOMMENDATION 4
-Met with daughter HCP Mabel Cloud at bedside today to discuss goals of care. --Document in alpha found to state Mabel and her brother Rashad as HCP - Rashad doesn't answer his phone at this time because he is ill and recovering from surgery.  - Patient is confused, she is unable to identify her daughter at bedside, is disoriented to location, time, and situation as well.   - Per Mabel, patient has had a rapid decline in functional status and orientation/agitation within the past couple of days. Albumin is low, daughter states her mother eats very little at the assistant living and depends on the assistance of the ancillary support in the dementia unit to help her with needs.  - At this time daughter wishes to continue to optimize her mother medically and initiate comfort measures after discharge. Mabel wants her mother transition to a nursing home with comfort/hospice support instead of returning to [facility]. Daughter feels her mother needs more support than what [facility] is able to provide at this time. Spoke with SW/Case management in ED to provide daughter with potential options for nursing home that can accommodate her needs.  - Discussed hospice services daughter is open to concept if she qualifies for program. Discussed with hospice liason at [facility].   - Confirmed with daughter that patient is DNR DNI.   - At this time will continue to support and monitor    Total time spent 60 minutes    Thank you for the opportunity to assist with the care of this patient.   [facility] [phone].

## 2019-10-01 NOTE — CONSULT NOTE ADULT - PROBLEM SELECTOR RECOMMENDATION 9
Post 2 units PRBC transfusions  Continue to monitor H/H , CBC panel  GI following - HCP not opting for further workup/EGD

## 2019-10-01 NOTE — CONSULT NOTE ADULT - ASSESSMENT
Elderly WF with coffee ground vomitus, large volume.  Known to have a large intrathoracic gastric herniation.  On chronic PPI therapy, therefore unlikely to represent acid peptic disease. No anticoagulants or anti-platelet medications.   Benign abdomen.  Suspect ischemic segment of the stomach from either volvulus or unreduceable gastric hernia, with vascular compromise.    Suggest:  Chest surgery evaluation.  Considering EGD in AM if remains clinically stable.  Would need consent and reversal of DNR for any procedures.   Would avoid NGT in this setting.  Agree with PPI drip, IVF, and transfusions to maintain Hgb>8.  Serial BW.  Requesting CXR and EKG.    Unable to reach son on 2 different Tele #'s.
This is a 89 year old, frail, female PMHx dementia, UGIB, anemia, admitted to [facility] for another episode of UGIB requiring blood tranfusion   CT abdomen found an intrathoracic gastric-herniation in which a mass or ulcer cant be excludued. Hepatic cyst found as well. - daughter declining further workup from GI - opting for comfort on discharge

## 2019-10-01 NOTE — PROGRESS NOTE ADULT - ASSESSMENT
89 y.o. F from [facility] with hx of depression, HLD, hypothyroidism, anemia, severe dementia, prior coffee ground emesis 2/2 UGIB on 1/2018 with imaging at that time showing a large intra-thoracic stomach within a hiatal hernia; pt was transfused 2 u prbc at that time and no GI intervention was desired by the family. Currently the pt presents with recurrent coffee ground emesis, noted ct abd/pelvis imaging with nearly intrathoracic stomach suspected ischemic segment of the stomach from either volvulus or non- reduceable gastric hernia, with vascular compromise. GI consulted for possible EGD, but after extensive d/w HCP Mabel (daughter) opted for no further aggressive measures/ procedures and to c/w supportive care with fluids/ blood products. She wants comfort care/ palliative care involvement.      > acute  UGIB / likely upper / Acute blood loss anemia / s/p 2 units prbcs   -monitor h/h , tx for hb <7.5 -8  - c/w zofran prn   - c/w PPI infusion   - c/w IVF     - keep NPO for now   - ER gave the pt 2 u prbc   - c/w monitoring h/h   - repeat in am   -GI on baord   -family does not want any family procedures     >Severe dementia with behavioral disturbances  - c/w donepezil po qhs , haldol prn   - c/w supportive care  - 1:1 observation for safety/ relaxation techniques; avoiding restraints     >Depression   - c/w mirtazapine po qhs  - c/w celexa po qd     >HTN  - hold lasix   -monitor    >Hypothyroid  - c/w synthroid 50mcg IV QD  (decreasing dose by half)     >DVT ppx  -scd boots b/l     Advanced Directive: DNR/DNI . family does not any any invasive procedures including egd. palliative  care called

## 2019-10-01 NOTE — PROGRESS NOTE ADULT - SUBJECTIVE AND OBJECTIVE BOX
Patient seen and examined;  Stable overnight.  Still NPO and receiving IVF.  Pantop drip still in place,  Transfused 2 units prbc's and Hgb:  9.7 to 10.4.    No complaints.  Had been agitated overnight and aggressive.  Now with 1:1 and calm and coopreative.  Had CXR and CT A/P which both showed an intrathoracic stomach.  No perforation.  No PTX.  EKG was normal.  Remains NPO.  Has brown stool in diaper. No further reports of vomiting.  States no abdominal pain.     Dr Herrera contacted Daughter, Mabel.  Plan is for supportive care and no procedures considering age and comorbidities.  Chest surgery consult never called.      PAST MEDICAL & SURGICAL HISTORY:  COPD (chronic obstructive pulmonary disease)  Hyperlipemia  Hypothyroidism (acquired)  Dementia  Anemia  No significant past surgical history      ROS:  No Heartburn, regurgitation, dysphagia, odynophagia.  No dyspepsia  No abdominal pain.    No Nausea, vomiting.  No Bleeding.  No hematemesis.   No diarrhea.    No hematochesia.  No weight loss, anorexia.  No edema.      MEDICATIONS  (STANDING):  citalopram 10 milliGRAM(s) Oral at bedtime  dextrose 5% + sodium chloride 0.9%. 1000 milliLiter(s) (75 mL/Hr) IV Continuous <Continuous>  dextrose 5%. 1000 milliLiter(s) (50 mL/Hr) IV Continuous <Continuous>  dextrose 50% Injectable 12.5 Gram(s) IV Push once  dextrose 50% Injectable 25 Gram(s) IV Push once  dextrose 50% Injectable 25 Gram(s) IV Push once  donepezil 10 milliGRAM(s) Oral at bedtime  levothyroxine Injectable 50 MICROGram(s) IV Push at bedtime  mirtazapine 7.5 milliGRAM(s) Oral at bedtime  pantoprazole Infusion 8 mG/Hr (10 mL/Hr) IV Continuous <Continuous>    MEDICATIONS  (PRN):  dextrose 40% Gel 15 Gram(s) Oral once PRN Blood Glucose LESS THAN 70 milliGRAM(s)/deciLiter  glucagon  Injectable 1 milliGRAM(s) IntraMuscular once PRN Glucose <70 milliGRAM(s)/deciLiter  haloperidol    Injectable 0.5 milliGRAM(s) IV Push every 8 hours PRN agitation/ aggression  ondansetron Injectable 4 milliGRAM(s) IV Push every 8 hours PRN Nausea and/or Vomiting      Allergies    No Known Allergies    Intolerances        Vital Signs Last 24 Hrs  T(C): 36.6 (01 Oct 2019 08:39), Max: 37.1 (30 Sep 2019 23:35)  T(F): 97.9 (01 Oct 2019 08:39), Max: 98.8 (30 Sep 2019 23:35)  HR: 66 (01 Oct 2019 08:39) (66 - 88)  BP: 99/46 (01 Oct 2019 08:39) (92/52 - 131/75)  BP(mean): --  RR: 16 (01 Oct 2019 08:39) (16 - 20)  SpO2: 95% (01 Oct 2019 02:45) (93% - 98%)    PHYSICAL EXAM:    GENERAL: NAD, well-groomed, well-developed  HEAD:  Atraumatic, Normocephalic  EYES: EOMI, PERRLA, conjunctiva and sclera clear  ENMT: No tonsillar erythema, exudates, or enlargement; Moist mucous membranes, Good dentition, No lesions  NECK: Supple, No JVD, Normal thyroid  CHEST/LUNG: Clear to percussion bilaterally; No rales, rhonchi, wheezing, or rubs  HEART: Regular rate and rhythm; No murmurs, rubs, or gallops  ABDOMEN: Soft, Nontender, Nondistended; Bowel sounds present.  No HSM.  No MTR.  REJI:  No lesions.  Soft brown stool in rectal vault.    EXTREMITIES:  2+ Peripheral Pulses, No clubbing, cyanosis, or edema  LYMPH: No lymphadenopathy noted  SKIN: No rashes or lesions      LABS:                        10.4   7.37  )-----------( 269      ( 01 Oct 2019 06:23 )             32.8     10-01    142  |  108<H>  |  21.0<H>  ----------------------------<  98  4.2   |  25.0  |  0.79    Ca    8.0<L>      01 Oct 2019 06:23    TPro  5.4<L>  /  Alb  2.9<L>  /  TBili  0.5  /  DBili  x   /  AST  19  /  ALT  9   /  AlkPhos  79  10-01    PT/INR - ( 30 Sep 2019 16:58 )   PT: 11.5 sec;   INR: 1.00 ratio         PTT - ( 30 Sep 2019 16:58 )  PTT:25.5 sec         RADIOLOGY & ADDITIONAL STUDIES:  CXR:  NAPD.  Intrathoracic stomach on left.    CT scan A.P:  Large HH with mostly intrathoracic stomach.  No free air.  No PTX.  No ascites.

## 2019-10-01 NOTE — CONSULT NOTE ADULT - SUBJECTIVE AND OBJECTIVE BOX
This is a Palliative Care Consult    <HPI:  89 y.o. F from [facility] with hx of depression, HLD, hypothyroidism, anemia, severe dementia, prior coffee ground emesis 2/2 UGIB on 1/2018 with imaging at that time showing a large intra-thoracic stomach within a hiatal hernia; pt was transfused 2 u prbc at that time and no GI intervention was desired by the family. Currently the pt presents with recurrent coffee ground emesis throughout the day.  Nurse described pt as being covered with coffee grounds and vomiting about 500 cc into an emesis basin. As per ER physician's note although MOLST in the chart and pt has DNR status, family agreed with fluid and blood product resuscitation. As per paper work pt is not on any blood thinners/ has not used NSAIDS. Unable to obtain hx/ROS from the patient given severe dementia, was becoming more combative with staff. (30 Sep 2019 22:14)> end of copied text    PERTINENT PMH REVIEWED: Yes   PAST MEDICAL & SURGICAL HISTORY:  COPD (chronic obstructive pulmonary disease)  Hyperlipemia  Hypothyroidism (acquired)  Dementia  Anemia  No significant past surgical history    SOCIAL HISTORY:                                     Admitted from:  [facility]     Surrogate/HCP/Guardian: Phone#:    FAMILY HISTORY:  Family history of essential hypertension      Baseline ADLs (prior to admission):  Independent/ Dependent      Allergies    No Known Allergies    Intolerances        Present Symptoms:     Dyspnea: 0 1 2 3   Nausea/Vomiting: Yes No  Anxiety:  Yes No  Depression: Yes No  Fatigue: Yes No  Loss of appetite: Yes No    Pain:             Character-            Duration-            Effect-            Factors-            Frequency-            Location-            Severity-    Review of Systems: Reviewed                     Negative:                     Positive:  Unable to obtain due to poor mentation   All others negative    MEDICATIONS  (STANDING):  citalopram 10 milliGRAM(s) Oral at bedtime  dextrose 5% + sodium chloride 0.9%. 1000 milliLiter(s) (75 mL/Hr) IV Continuous <Continuous>  dextrose 5%. 1000 milliLiter(s) (50 mL/Hr) IV Continuous <Continuous>  dextrose 50% Injectable 12.5 Gram(s) IV Push once  dextrose 50% Injectable 25 Gram(s) IV Push once  dextrose 50% Injectable 25 Gram(s) IV Push once  donepezil 10 milliGRAM(s) Oral at bedtime  levothyroxine Injectable 50 MICROGram(s) IV Push at bedtime  mirtazapine 7.5 milliGRAM(s) Oral at bedtime  pantoprazole Infusion 8 mG/Hr (10 mL/Hr) IV Continuous <Continuous>    MEDICATIONS  (PRN):  dextrose 40% Gel 15 Gram(s) Oral once PRN Blood Glucose LESS THAN 70 milliGRAM(s)/deciLiter  glucagon  Injectable 1 milliGRAM(s) IntraMuscular once PRN Glucose <70 milliGRAM(s)/deciLiter  haloperidol    Injectable 0.5 milliGRAM(s) IV Push every 8 hours PRN agitation/ aggression  ondansetron Injectable 4 milliGRAM(s) IV Push every 8 hours PRN Nausea and/or Vomiting      PHYSICAL EXAM:    Vital Signs Last 24 Hrs  T(C): 36.6 (01 Oct 2019 08:39), Max: 37.1 (30 Sep 2019 23:35)  T(F): 97.9 (01 Oct 2019 08:39), Max: 98.8 (30 Sep 2019 23:35)  HR: 66 (01 Oct 2019 08:39) (66 - 88)  BP: 99/46 (01 Oct 2019 08:39) (92/52 - 131/75)  BP(mean): --  RR: 16 (01 Oct 2019 08:39) (16 - 20)  SpO2: 95% (01 Oct 2019 02:45) (93% - 98%)    General: alert  oriented x ____ lethargic agitated                  cachexia  nonverbal  coma    Karnofsky:  %    HEENT: normal  dry mouth  ET tube/trach    Lungs: comfortable tachypnea/labored breathing  excessive secretions    CV: normal  tachycardia    GI: normal  distended  tender  no BS               PEG/NG/OG tube  constipation  last BM:     : normal  incontinent  oliguria/anuria  pham    MSK: normal  weakness  edema             ambulatory  bedbound/wheelchair bound    Skin: normal  pressure ulcers- Stage_____  no rash    LABS:                        10.4   7.37  )-----------( 269      ( 01 Oct 2019 06:23 )             32.8     10-01    142  |  108<H>  |  21.0<H>  ----------------------------<  98  4.2   |  25.0  |  0.79    Ca    8.0<L>      01 Oct 2019 06:23    TPro  5.4<L>  /  Alb  2.9<L>  /  TBili  0.5  /  DBili  x   /  AST  19  /  ALT  9   /  AlkPhos  79  10-01    PT/INR - ( 30 Sep 2019 16:58 )   PT: 11.5 sec;   INR: 1.00 ratio         PTT - ( 30 Sep 2019 16:58 )  PTT:25.5 sec    I&O's Summary    30 Sep 2019 07:01  -  01 Oct 2019 07:00  --------------------------------------------------------  IN: 682 mL / OUT: 0 mL / NET: 682 mL        RADIOLOGY & ADDITIONAL STUDIES:    ADVANCE DIRECTIVES:   DNR YES NO  Completed on:                     MOLST  YES NO   Completed on:  Living Will  YES NO   Completed on: This is a Palliative Care Consult    <HPI:  89 y.o. F from [facility] with hx of depression, HLD, hypothyroidism, anemia, severe dementia, prior coffee ground emesis 2/2 UGIB on 1/2018 with imaging at that time showing a large intra-thoracic stomach within a hiatal hernia; pt was transfused 2 u prbc at that time and no GI intervention was desired by the family. Currently the pt presents with recurrent coffee ground emesis throughout the day.  Nurse described pt as being covered with coffee grounds and vomiting about 500 cc into an emesis basin. As per ER physician's note although MOLST in the chart and pt has DNR status, family agreed with fluid and blood product resuscitation. As per paper work pt is not on any blood thinners/ has not used NSAIDS. Unable to obtain hx/ROS from the patient given severe dementia, was becoming more combative with staff. (30 Sep 2019 22:14)> end of copied text    PERTINENT PMH REVIEWED: Yes   PAST MEDICAL & SURGICAL HISTORY:  COPD (chronic obstructive pulmonary disease)  Hyperlipemia  Hypothyroidism (acquired)  Dementia  Anemia  No significant past surgical history    SOCIAL HISTORY:                                     Admitted from:  [facility]     Surrogate/HCP/Guardian: Phone#: Mabel Cloud [phone]    FAMILY HISTORY:  Family history of essential hypertension      Baseline ADLs (prior to admission):  Independent with assist    Allergies  No Known Allergies    Present Symptoms:   Dyspnea: 0    Nausea/Vomiting: No  Anxiety:  Unable to obtain due to poor mentation   Depression:Unable to obtain due to poor mentation   Fatigue: Yes   Loss of appetite: Yes     Pain: Appears comfortable - difficult to obtain due to poor mentation             Character-            Duration-            Effect-            Factors-            Frequency-            Location-            Severity-    Review of Systems: Reviewed  Unable to obtain due to poor mentation     MEDICATIONS  (STANDING):  citalopram 10 milliGRAM(s) Oral at bedtime  dextrose 5% + sodium chloride 0.9%. 1000 milliLiter(s) (75 mL/Hr) IV Continuous <Continuous>  dextrose 5%. 1000 milliLiter(s) (50 mL/Hr) IV Continuous <Continuous>  dextrose 50% Injectable 12.5 Gram(s) IV Push once  dextrose 50% Injectable 25 Gram(s) IV Push once  dextrose 50% Injectable 25 Gram(s) IV Push once  donepezil 10 milliGRAM(s) Oral at bedtime  levothyroxine Injectable 50 MICROGram(s) IV Push at bedtime  mirtazapine 7.5 milliGRAM(s) Oral at bedtime  pantoprazole Infusion 8 mG/Hr (10 mL/Hr) IV Continuous <Continuous>    MEDICATIONS  (PRN):  dextrose 40% Gel 15 Gram(s) Oral once PRN Blood Glucose LESS THAN 70 milliGRAM(s)/deciLiter  glucagon  Injectable 1 milliGRAM(s) IntraMuscular once PRN Glucose <70 milliGRAM(s)/deciLiter  haloperidol    Injectable 0.5 milliGRAM(s) IV Push every 8 hours PRN agitation/ aggression  ondansetron Injectable 4 milliGRAM(s) IV Push every 8 hours PRN Nausea and/or Vomiting    PHYSICAL EXAM:  Vital Signs Last 24 Hrs  T(C): 36.6 (01 Oct 2019 08:39), Max: 37.1 (30 Sep 2019 23:35)  T(F): 97.9 (01 Oct 2019 08:39), Max: 98.8 (30 Sep 2019 23:35)  HR: 66 (01 Oct 2019 08:39) (66 - 88)  BP: 99/46 (01 Oct 2019 08:39) (92/52 - 131/75)  BP(mean): --  RR: 16 (01 Oct 2019 08:39) (16 - 20)  SpO2: 95% (01 Oct 2019 02:45) (93% - 98%)    General: alert  confused to person, place, time    HEENT: dry mouth      Lungs: comfortable    CV: normal      GI: soft, non tender on palpation, admitted for U GIB, incontinent of stool    : incontinent    MSK: weakness     Skin: thin frail skin    LABS:                        10.4   7.37  )-----------( 269      ( 01 Oct 2019 06:23 )             32.8     10-01    142  |  108<H>  |  21.0<H>  ----------------------------<  98  4.2   |  25.0  |  0.79    Ca    8.0<L>      01 Oct 2019 06:23    TPro  5.4<L>  /  Alb  2.9<L>  /  TBili  0.5  /  DBili  x   /  AST  19  /  ALT  9   /  AlkPhos  79  10-01    PT/INR - ( 30 Sep 2019 16:58 )   PT: 11.5 sec;   INR: 1.00 ratio         PTT - ( 30 Sep 2019 16:58 )  PTT:25.5 sec    I&O's Summary    30 Sep 2019 07:01  -  01 Oct 2019 07:00  --------------------------------------------------------  IN: 682 mL / OUT: 0 mL / NET: 682 mL    RADIOLOGY & ADDITIONAL STUDIES:  CT ABDOMEN 10.01.19  IMPRESSION:   A total intrathoracic gastricherniation of which is of fluid and   air-filled. A gastric mass or ulcer cannot be excluded. Intra-abdominal   bowel unremarkable. RIGHT hepatic cyst. Degenerative spondylosis of   scoliotic lumbar spine.    ADVANCE DIRECTIVES:   [facility] DNR DNI completed in 2018 This is a Palliative Care Consult    <HPI:  89 y.o. F from [facility] with hx of depression, HLD, hypothyroidism, anemia, severe dementia, prior coffee ground emesis 2/2 UGIB on 1/2018 with imaging at that time showing a large intra-thoracic stomach within a hiatal hernia; pt was transfused 2 u prbc at that time and no GI intervention was desired by the family. Currently the pt presents with recurrent coffee ground emesis throughout the day.  Nurse described pt as being covered with coffee grounds and vomiting about 500 cc into an emesis basin. As per ER physician's note although MOLST in the chart and pt has DNR status, family agreed with fluid and blood product resuscitation. As per paper work pt is not on any blood thinners/ has not used NSAIDS. Unable to obtain hx/ROS from the patient given severe dementia, was becoming more combative with staff. (30 Sep 2019 22:14)> end of copied text    PERTINENT PMH REVIEWED: Yes   PAST MEDICAL & SURGICAL HISTORY:  COPD (chronic obstructive pulmonary disease)  Hyperlipemia  Hypothyroidism (acquired)  Dementia  Anemia  No significant past surgical history    SOCIAL HISTORY:                                     Admitted from:  [facility]     Surrogate/HCP/Guardian: Phone#: Mabel Cloud [phone]    FAMILY HISTORY:  Family history of essential hypertension      Baseline ADLs (prior to admission):  Independent with assist    Allergies  No Known Allergies    Present Symptoms:   Dyspnea: 0    Nausea/Vomiting: No  Anxiety:  Unable to obtain due to poor mentation   Depression:Unable to obtain due to poor mentation   Fatigue: Yes   Loss of appetite: Yes     Pain: Appears comfortable - difficult to obtain due to poor mentation             Character-            Duration-            Effect-            Factors-            Frequency-            Location-            Severity-    Review of Systems: Reviewed  Unable to obtain due to poor mentation     MEDICATIONS  (STANDING):  citalopram 10 milliGRAM(s) Oral at bedtime  dextrose 5% + sodium chloride 0.9%. 1000 milliLiter(s) (75 mL/Hr) IV Continuous <Continuous>  dextrose 5%. 1000 milliLiter(s) (50 mL/Hr) IV Continuous <Continuous>  dextrose 50% Injectable 12.5 Gram(s) IV Push once  dextrose 50% Injectable 25 Gram(s) IV Push once  dextrose 50% Injectable 25 Gram(s) IV Push once  donepezil 10 milliGRAM(s) Oral at bedtime  levothyroxine Injectable 50 MICROGram(s) IV Push at bedtime  mirtazapine 7.5 milliGRAM(s) Oral at bedtime  pantoprazole Infusion 8 mG/Hr (10 mL/Hr) IV Continuous <Continuous>    MEDICATIONS  (PRN):  dextrose 40% Gel 15 Gram(s) Oral once PRN Blood Glucose LESS THAN 70 milliGRAM(s)/deciLiter  glucagon  Injectable 1 milliGRAM(s) IntraMuscular once PRN Glucose <70 milliGRAM(s)/deciLiter  haloperidol    Injectable 0.5 milliGRAM(s) IV Push every 8 hours PRN agitation/ aggression  ondansetron Injectable 4 milliGRAM(s) IV Push every 8 hours PRN Nausea and/or Vomiting    PHYSICAL EXAM:  Vital Signs Last 24 Hrs  T(C): 36.6 (01 Oct 2019 08:39), Max: 37.1 (30 Sep 2019 23:35)  T(F): 97.9 (01 Oct 2019 08:39), Max: 98.8 (30 Sep 2019 23:35)  HR: 66 (01 Oct 2019 08:39) (66 - 88)  BP: 99/46 (01 Oct 2019 08:39) (92/52 - 131/75)  BP(mean): --  RR: 16 (01 Oct 2019 08:39) (16 - 20)  SpO2: 95% (01 Oct 2019 02:45) (93% - 98%)    General: alert  confused to person, place, time    HEENT: dry mouth      Lungs: comfortable    CV: normal      GI: soft, non tender on palpation, admitted for U GIB, incontinent of stool    : incontinent    MSK: weakness     Skin: thin frail skin    LABS:                        10.4   7.37  )-----------( 269      ( 01 Oct 2019 06:23 )             32.8     10-01    142  |  108<H>  |  21.0<H>  ----------------------------<  98  4.2   |  25.0  |  0.79    Ca    8.0<L>      01 Oct 2019 06:23    TPro  5.4<L>  /  Alb  2.9<L>  /  TBili  0.5  /  DBili  x   /  AST  19  /  ALT  9   /  AlkPhos  79  10-01    PT/INR - ( 30 Sep 2019 16:58 )   PT: 11.5 sec;   INR: 1.00 ratio         PTT - ( 30 Sep 2019 16:58 )  PTT:25.5 sec    I&O's Summary    30 Sep 2019 07:01  -  01 Oct 2019 07:00  --------------------------------------------------------  IN: 682 mL / OUT: 0 mL / NET: 682 mL    RADIOLOGY & ADDITIONAL STUDIES:  CT ABDOMEN 10.01.19  IMPRESSION:   A total intrathoracic gastricherniation of which is of fluid and   air-filled. A gastric mass or ulcer cannot be excluded. Intra-abdominal   bowel unremarkable. RIGHT hepatic cyst. Degenerative spondylosis of   scoliotic lumbar spine.    ADVANCE DIRECTIVES:   [facility] DNR DNI completed in 2018  Reconfirmed with daughter as well as on alpha where there is a copy patient's wishes made in the past power  and living will

## 2019-10-01 NOTE — CHART NOTE - NSCHARTNOTEFT_GEN_A_CORE
Called by RN to renew 1:1.  Pt with hx of severe dementia admitted for UGIB.  1:1 order placed last night after pt sun downed and got combative, tried to get OOB. 1:1 order placed until tomorrow am considering pt safety.  Continue to monitor and reassess prn.

## 2019-10-02 LAB
ANION GAP SERPL CALC-SCNC: 8 MMOL/L — SIGNIFICANT CHANGE UP (ref 5–17)
BUN SERPL-MCNC: 10 MG/DL — SIGNIFICANT CHANGE UP (ref 8–20)
CALCIUM SERPL-MCNC: 8.2 MG/DL — LOW (ref 8.6–10.2)
CHLORIDE SERPL-SCNC: 110 MMOL/L — HIGH (ref 98–107)
CO2 SERPL-SCNC: 25 MMOL/L — SIGNIFICANT CHANGE UP (ref 22–29)
CREAT SERPL-MCNC: 0.69 MG/DL — SIGNIFICANT CHANGE UP (ref 0.5–1.3)
GLUCOSE BLDC GLUCOMTR-MCNC: 92 MG/DL — SIGNIFICANT CHANGE UP (ref 70–99)
GLUCOSE BLDC GLUCOMTR-MCNC: 98 MG/DL — SIGNIFICANT CHANGE UP (ref 70–99)
GLUCOSE SERPL-MCNC: 89 MG/DL — SIGNIFICANT CHANGE UP (ref 70–115)
HCT VFR BLD CALC: 32.7 % — LOW (ref 34.5–45)
HGB BLD-MCNC: 10.2 G/DL — LOW (ref 11.5–15.5)
MCHC RBC-ENTMCNC: 30.6 PG — SIGNIFICANT CHANGE UP (ref 27–34)
MCHC RBC-ENTMCNC: 31.2 GM/DL — LOW (ref 32–36)
MCV RBC AUTO: 98.2 FL — SIGNIFICANT CHANGE UP (ref 80–100)
PLATELET # BLD AUTO: 271 K/UL — SIGNIFICANT CHANGE UP (ref 150–400)
POTASSIUM SERPL-MCNC: 4.3 MMOL/L — SIGNIFICANT CHANGE UP (ref 3.5–5.3)
POTASSIUM SERPL-SCNC: 4.3 MMOL/L — SIGNIFICANT CHANGE UP (ref 3.5–5.3)
RBC # BLD: 3.33 M/UL — LOW (ref 3.8–5.2)
RBC # FLD: 14.9 % — HIGH (ref 10.3–14.5)
SODIUM SERPL-SCNC: 143 MMOL/L — SIGNIFICANT CHANGE UP (ref 135–145)
WBC # BLD: 5.83 K/UL — SIGNIFICANT CHANGE UP (ref 3.8–10.5)
WBC # FLD AUTO: 5.83 K/UL — SIGNIFICANT CHANGE UP (ref 3.8–10.5)

## 2019-10-02 PROCEDURE — 99231 SBSQ HOSP IP/OBS SF/LOW 25: CPT

## 2019-10-02 RX ADMIN — CITALOPRAM 10 MILLIGRAM(S): 10 TABLET, FILM COATED ORAL at 22:08

## 2019-10-02 RX ADMIN — PANTOPRAZOLE SODIUM 10 MG/HR: 20 TABLET, DELAYED RELEASE ORAL at 05:06

## 2019-10-02 RX ADMIN — HALOPERIDOL DECANOATE 0.5 MILLIGRAM(S): 100 INJECTION INTRAMUSCULAR at 05:06

## 2019-10-02 RX ADMIN — DONEPEZIL HYDROCHLORIDE 10 MILLIGRAM(S): 10 TABLET, FILM COATED ORAL at 22:08

## 2019-10-02 RX ADMIN — MIRTAZAPINE 7.5 MILLIGRAM(S): 45 TABLET, ORALLY DISINTEGRATING ORAL at 22:08

## 2019-10-02 RX ADMIN — HALOPERIDOL DECANOATE 0.5 MILLIGRAM(S): 100 INJECTION INTRAMUSCULAR at 12:43

## 2019-10-02 RX ADMIN — Medication 50 MICROGRAM(S): at 22:07

## 2019-10-02 RX ADMIN — PANTOPRAZOLE SODIUM 10 MG/HR: 20 TABLET, DELAYED RELEASE ORAL at 16:30

## 2019-10-02 NOTE — GOALS OF CARE CONVERSATION - ADVANCED CARE PLANNING - NS PRO AD PATIENT TYPE
Health Care Proxy (HCP)/Medical Orders for Life-Sustaining Treatment (MOLST)
Medical Orders for Life-Sustaining Treatment (MOLST)/Health Care Proxy (HCP)
Medical Orders for Life-Sustaining Treatment (MOLST)/Health Care Proxy (HCP)

## 2019-10-02 NOTE — GOALS OF CARE CONVERSATION - ADVANCED CARE PLANNING - NS PRO AD PATIENT TYPE ON CHART
Medical Orders for Life-Sustaining Treatment (MOLST)/Power of  (POA) for Healthcare Issues

## 2019-10-02 NOTE — PROGRESS NOTE ADULT - SUBJECTIVE AND OBJECTIVE BOX
cc: gib     interval hx: patient seen and eval. comfortable. in no acute distress.  no fever or chills.     Vital Signs Last 24 Hrs  T(C): 36.6 (02 Oct 2019 16:56), Max: 36.6 (02 Oct 2019 16:56)  T(F): 97.9 (02 Oct 2019 16:56), Max: 97.9 (02 Oct 2019 16:56)  HR: 88 (02 Oct 2019 16:56) (88 - 110)  BP: 126/75 (02 Oct 2019 16:56) (120/63 - 126/75)  BP(mean): --  RR: 18 (02 Oct 2019 16:56) (18 - 18)  SpO2: 98% (02 Oct 2019 16:56) (91% - 100%)    gen: awake , cooperative , in no acute distress, confused   HEENT : mm midly dry   lungs : clear b/l   cvs : s1s2, rrr  abd : soft , nontender , bs nl   ext : no edema   neuro: confused , awake, alert x 1 , movign all ext                           10.2   5.83  )-----------( 271      ( 02 Oct 2019 07:26 )             32.7   10-02    143  |  110<H>  |  10.0  ----------------------------<  89  4.3   |  25.0  |  0.69    Ca    8.2<L>      02 Oct 2019 07:26    TPro  5.4<L>  /  Alb  2.9<L>  /  TBili  0.5  /  DBili  x   /  AST  19  /  ALT  9   /  AlkPhos  79  10-01

## 2019-10-02 NOTE — PHYSICAL THERAPY INITIAL EVALUATION ADULT - ADDITIONAL COMMENTS
Pt unable to provide info for questions asked, as per chart, pt is independent with ambulation, requires assist with ADLs. Pt has dementia with recent decline

## 2019-10-02 NOTE — PHYSICAL THERAPY INITIAL EVALUATION ADULT - CRITERIA FOR SKILLED THERAPEUTIC INTERVENTIONS
risk reduction/prevention/therapy frequency/rehab potential/anticipated equipment needs at discharge/functional limitations in following categories/predicted duration of therapy intervention/anticipated discharge recommendation/impairments found

## 2019-10-02 NOTE — PHYSICAL THERAPY INITIAL EVALUATION ADULT - PERTINENT HX OF CURRENT PROBLEM, REHAB EVAL
89 y.o. F from [facility] with hx of depression, HLD, hypothyroidism, anemia, severe dementia, prior coffee ground emesis 2/2 UGIB on 1/2018 with imaging at that time showing a large intra-thoracic stomach within a hiatal hernia;

## 2019-10-02 NOTE — GOALS OF CARE CONVERSATION - ADVANCED CARE PLANNING - CONVERSATION DETAILS
Sw met with patient and contacted dgt, left message to offer support in coping with patients medical issues and end of life planning. Sw conferred with Hospice liaison who reports dgt coordinating private pay for SNF with Hospice services. Goals of care established for comfort care.
HCN liaison met with dtr Mabel at bedside. Dtr is seeking admission to LTC placement with HCN services. CM and SW aware.
-Met with daughter HCP Mabel Cloud at bedside today to discuss goals of care. --Document in alpha found to state Mabel and her brother Rashad as HCP - Rashad doesn't answer his phone at this time because he is ill and recovering from surgery.  - Patient is confused, she is unable to identify her daughter at bedside, is disoriented to location, time, and situation as well.   - Per Mabel, patient has had a rapid decline in functional status and orientation/agitation within the past couple of days. Albumin is low, daughter states her mother eats very little at the assistant living and depends on the assistance of the ancillary support in the dementia unit to help her with needs.  - At this time daughter wishes to continue to optimize her mother medically and initiate comfort measures after discharge. Mabel wants her mother transition to a nursing home with comfort/hospice support instead of returning to [facility]. Daughter feels her mother needs more support than what [facility] is able to provide at this time. Spoke with SW/Case management in ED to provide daughter with potential options for nursing home that can accommodate her needs.  - Discussed hospice services daughter is open to concept if she qualifies for program. Discussed with hospice liason at [facility].   - Confirmed with daughter that patient is DNR DNI.   - At this time will continue to support and monitor    COUNSELING:  Face to face meeting to discuss Advanced Care Planning - Time Spent ___30___Minutes.  See goals of care note.      Thank you for the opportunity to assist with the care of this patient.   [facility] [phone].

## 2019-10-02 NOTE — PHYSICAL THERAPY INITIAL EVALUATION ADULT - PLANNED THERAPY INTERVENTIONS, PT EVAL
motor coordination training/neuromuscular re-education/balance training/bed mobility training/gait training/strengthening/transfer training

## 2019-10-02 NOTE — PROGRESS NOTE ADULT - ASSESSMENT
89 y.o. F from [facility] with hx of depression, HLD, hypothyroidism, anemia, severe dementia, prior coffee ground emesis 2/2 UGIB on 1/2018 with imaging at that time showing a large intra-thoracic stomach within a hiatal hernia; pt was transfused 2 u prbc at that time and no GI intervention was desired by the family. Currently the pt presents with recurrent coffee ground emesis, noted ct abd/pelvis imaging with nearly intrathoracic stomach suspected ischemic segment of the stomach from either volvulus or non- reduceable gastric hernia, with vascular compromise. GI consulted for possible EGD, but after extensive d/w HCP Mabel (daughter) opted for no further aggressive measures/ procedures and to c/w supportive care with fluids/ blood products. She wants comfort care/ palliative care involvement.      > acute  UGIB / likely upper / Acute blood loss anemia / s/p 2 units prbcs   -monitor h/h , tx as needed   - c/w PPI   - clear liquids, advance as tolrated   -family does not want any family procedures     >Severe dementia with behavioral disturbances  - c/w donepezil po qhs , haldol prn   - c/w supportive care    >Depression   - c/w mirtazapine po qhs  - c/w celexa po qd     >HTN  - hold lasix   -monitor    >Hypothyroid  - c/w synthroid 50mcg IV QD  (decreasing dose by half)     >DVT ppx  -scd boots b/l     Advanced Directive: DNR/DNI . family does not any any invasive procedures including egd. palliative  care called

## 2019-10-03 LAB
GLUCOSE BLDC GLUCOMTR-MCNC: 101 MG/DL — HIGH (ref 70–99)
GLUCOSE BLDC GLUCOMTR-MCNC: 88 MG/DL — SIGNIFICANT CHANGE UP (ref 70–99)

## 2019-10-03 PROCEDURE — 99232 SBSQ HOSP IP/OBS MODERATE 35: CPT

## 2019-10-03 RX ORDER — MULTIVIT-MIN/FERROUS GLUCONATE 9 MG/15 ML
15 LIQUID (ML) ORAL DAILY
Refills: 0 | Status: DISCONTINUED | OUTPATIENT
Start: 2019-10-03 | End: 2019-10-04

## 2019-10-03 RX ADMIN — DONEPEZIL HYDROCHLORIDE 10 MILLIGRAM(S): 10 TABLET, FILM COATED ORAL at 21:54

## 2019-10-03 RX ADMIN — MIRTAZAPINE 7.5 MILLIGRAM(S): 45 TABLET, ORALLY DISINTEGRATING ORAL at 21:54

## 2019-10-03 RX ADMIN — PANTOPRAZOLE SODIUM 10 MG/HR: 20 TABLET, DELAYED RELEASE ORAL at 16:14

## 2019-10-03 RX ADMIN — HALOPERIDOL DECANOATE 0.5 MILLIGRAM(S): 100 INJECTION INTRAMUSCULAR at 12:58

## 2019-10-03 RX ADMIN — HALOPERIDOL DECANOATE 0.5 MILLIGRAM(S): 100 INJECTION INTRAMUSCULAR at 03:47

## 2019-10-03 RX ADMIN — CITALOPRAM 10 MILLIGRAM(S): 10 TABLET, FILM COATED ORAL at 21:54

## 2019-10-03 RX ADMIN — PANTOPRAZOLE SODIUM 10 MG/HR: 20 TABLET, DELAYED RELEASE ORAL at 03:47

## 2019-10-03 NOTE — PROGRESS NOTE ADULT - REASON FOR ADMISSION
Vomiting blood
Vomiting blood; Intrathoracic stomach; severe dementia.

## 2019-10-03 NOTE — DIETITIAN INITIAL EVALUATION ADULT. - ETIOLOGY
related to insufficient protein-energy intake in setting of GIB, COPD, reduced cognition 2/2 severe dementia, advanced age

## 2019-10-03 NOTE — CHART NOTE - NSCHARTNOTEFT_GEN_A_CORE
Upon Nutritional Assessment by the Registered Dietitian your patient was determined to meet criteria / has evidence of the following diagnosis/diagnoses:          [x]  Moderate Protein Calorie Malnutrition    Findings as based on:  •  Comprehensive nutrition assessment and consultation  •  Calorie counts (nutrient intake analysis)  •  Food acceptance and intake status from observations by staff  •  Follow up  •  Patient education  •  Intervention secondary to interdisciplinary rounds  •   concerns    Pt presents at high nutrition risk secondary to malnutrition (moderate acute) related to insufficient protein-energy intake in setting of GIB, COPD, reduced cognition 2/2 severe dementia, advanced age as evidenced by moderate muscle wasting (interosseous), mild muscle wasting (temples, shoulders), mild fat loss (orbital), NPO x 3 days, likely meeting <75% of needs PTA. Aware palliative/hospice involvement.     Treatment:    The following diet has been recommended:  1) RD to respect Pt/family wishes regarding GOC and alternate means nutrition/hydration  2) If deemed appropriate for PO, consider SLP eval prior to feeding. If plan to feed, initiate CLD, advance as tolerated to low fiber/residue restricted +Ensure Clear TID  3) Consider MVI daily    PROVIDER Section:     By signing this assessment you are acknowledging and agree with the diagnosis/diagnoses assigned by the Registered Dietitian    Comments: Upon Nutritional Assessment by the Registered Dietitian your patient was determined to meet criteria / has evidence of the following diagnosis/diagnoses:          [x]  Moderate Protein Calorie Malnutrition    Findings as based on:  •  Comprehensive nutrition assessment and consultation  •  Calorie counts (nutrient intake analysis)  •  Food acceptance and intake status from observations by staff  •  Follow up  •  Patient education  •  Intervention secondary to interdisciplinary rounds  •   concerns    Pt presents at high nutrition risk secondary to malnutrition (moderate acute) related to insufficient protein-energy intake in setting of GIB, COPD, reduced cognition 2/2 severe dementia, advanced age as evidenced by moderate muscle wasting (interosseous), mild muscle wasting (temples, shoulders), mild fat loss (orbital), NPO x 3 days, likely meeting <75% of needs PTA. Aware palliative/hospice involvement.     Treatment:    The following diet has been recommended:  1) RD to respect Pt/family wishes regarding GOC and alternate means nutrition/hydration  2) If plan to feed, consider SLP eval prior to feeding. If deemed appropriate, initiate CLD, advance as tolerated to low fiber/residue restricted +Ensure Clear TID  3) Consider MVI daily    PROVIDER Section:     By signing this assessment you are acknowledging and agree with the diagnosis/diagnoses assigned by the Registered Dietitian    Comments:

## 2019-10-03 NOTE — PROGRESS NOTE ADULT - SUBJECTIVE AND OBJECTIVE BOX
cc: gib     interval hx: patient seen and eval. comfortable. in no acute distress.  cooperative. no n/v/abd pain     Vital Signs Last 24 Hrs  T(C): 36.3 (03 Oct 2019 08:46), Max: 36.6 (02 Oct 2019 16:56)  T(F): 97.3 (03 Oct 2019 08:46), Max: 97.9 (02 Oct 2019 16:56)  HR: 95 (03 Oct 2019 08:46) (88 - 95)  BP: 126/75 (02 Oct 2019 16:56) (126/75 - 126/75)  BP(mean): --  RR: 18 (03 Oct 2019 08:46) (18 - 18)  SpO2: 96% (03 Oct 2019 08:46) (96% - 98%)    gen: awake , cooperative , confused   HEENT : mm midly dry   lungs : clear b/l   cvs : s1s2, rrr  abd : soft , nontender , bs nl   ext : no edema   neuro: confused , awake, alert x 1 ,                                   10.2   5.83  )-----------( 271      ( 02 Oct 2019 07:26 )             32.7   10-02    143  |  110<H>  |  10.0  ----------------------------<  89  4.3   |  25.0  |  0.69    Ca    8.2<L>      02 Oct 2019 07:26

## 2019-10-03 NOTE — PROGRESS NOTE ADULT - ASSESSMENT
89 y.o. F from [facility] with hx of depression, HLD, hypothyroidism, anemia, severe dementia, prior coffee ground emesis 2/2 UGIB on 1/2018 with imaging at that time showing a large intra-thoracic stomach within a hiatal hernia; pt was transfused 2 u prbc at that time and no GI intervention was desired by the family. Currently the pt presents with recurrent coffee ground emesis, noted ct abd/pelvis imaging with nearly intrathoracic stomach suspected ischemic segment of the stomach from either volvulus or non- reduceable gastric hernia, with vascular compromise. GI consulted for possible EGD, but after extensive d/w HCP Mabel (daughter) opted for no further aggressive measures/ procedures and to c/w supportive care with fluids/ blood products. She wants comfort care/ palliative care involvement.      > acute  UGIB / likely upper / Acute blood loss anemia / s/p 2 units prbcs   -monitor h/h , tx as needed   - c/w PPI   -  advance as tolrated   -family does not want any family procedures     >Severe dementia with behavioral disturbances  - c/w donepezil po qhs , haldol prn   - c/w supportive care    >Depression   - c/w mirtazapine po qhs  - c/w celexa po qd     >HTN  - hold lasix   -monitor    >Hypothyroid  - c/w synthroid 50mcg IV QD  (decreasing dose by half)     >DVT ppx  -scd boots b/l     Advanced Directive: DNR/DNI . family does not any any invasive procedures including egd. plan for olivia placement

## 2019-10-03 NOTE — DIETITIAN INITIAL EVALUATION ADULT. - OTHER INFO
Pt admitted with anemia 2/2 GIB c/o vomiting, hx COPD, severe dementia, hypothyroid, HLD. Pt's daughter declines EGD requesting comfort care, aware palliative/hopsice following. Pt remains NPO, pleasantly confused during interview questionable information obtained, no family currently at bedside. Pt states appetite is good, UBW ~170lbs, per RD bedscale weight ~137lbs, aware 10lb weight discrepancy from admission weight, will continue to monitor.

## 2019-10-03 NOTE — DIETITIAN INITIAL EVALUATION ADULT. - ADD RECOMMEND
RD to respect Pt/family wishes regarding GOC and alternate means nutrition/hydration; If deemed appropriate for PO, consider SLP eval prior to feeding. If plan to feed, initiate CLD, advance as tolerated to low fiber/residue restricted +Ensure Clear TID; Consider MVI daily

## 2019-10-03 NOTE — DIETITIAN INITIAL EVALUATION ADULT. - PHYSICAL APPEARANCE
[facility] 25.9 [facility] and [facility]t 5'1" per transfer paperwork; NFPE conducted, physical findings include moderate muscle wasting (interosseous), mild muscle wasting (temples, shoulders), mild fat loss (orbital); Poor dentition

## 2019-10-04 ENCOUNTER — TRANSCRIPTION ENCOUNTER (OUTPATIENT)
Age: 84
End: 2019-10-04

## 2019-10-04 VITALS
DIASTOLIC BLOOD PRESSURE: 82 MMHG | OXYGEN SATURATION: 92 % | HEART RATE: 84 BPM | TEMPERATURE: 99 F | RESPIRATION RATE: 18 BRPM | SYSTOLIC BLOOD PRESSURE: 116 MMHG

## 2019-10-04 PROCEDURE — 83690 ASSAY OF LIPASE: CPT

## 2019-10-04 PROCEDURE — 82150 ASSAY OF AMYLASE: CPT

## 2019-10-04 PROCEDURE — 74176 CT ABD & PELVIS W/O CONTRAST: CPT

## 2019-10-04 PROCEDURE — 97530 THERAPEUTIC ACTIVITIES: CPT

## 2019-10-04 PROCEDURE — 86900 BLOOD TYPING SEROLOGIC ABO: CPT

## 2019-10-04 PROCEDURE — 83605 ASSAY OF LACTIC ACID: CPT

## 2019-10-04 PROCEDURE — 84484 ASSAY OF TROPONIN QUANT: CPT

## 2019-10-04 PROCEDURE — 85027 COMPLETE CBC AUTOMATED: CPT

## 2019-10-04 PROCEDURE — 97163 PT EVAL HIGH COMPLEX 45 MIN: CPT

## 2019-10-04 PROCEDURE — 80048 BASIC METABOLIC PNL TOTAL CA: CPT

## 2019-10-04 PROCEDURE — 82962 GLUCOSE BLOOD TEST: CPT

## 2019-10-04 PROCEDURE — 36415 COLL VENOUS BLD VENIPUNCTURE: CPT

## 2019-10-04 PROCEDURE — 85730 THROMBOPLASTIN TIME PARTIAL: CPT

## 2019-10-04 PROCEDURE — 85610 PROTHROMBIN TIME: CPT

## 2019-10-04 PROCEDURE — 99285 EMERGENCY DEPT VISIT HI MDM: CPT | Mod: 25

## 2019-10-04 PROCEDURE — P9016: CPT

## 2019-10-04 PROCEDURE — 36430 TRANSFUSION BLD/BLD COMPNT: CPT

## 2019-10-04 PROCEDURE — 96375 TX/PRO/DX INJ NEW DRUG ADDON: CPT

## 2019-10-04 PROCEDURE — 97110 THERAPEUTIC EXERCISES: CPT

## 2019-10-04 PROCEDURE — 71045 X-RAY EXAM CHEST 1 VIEW: CPT

## 2019-10-04 PROCEDURE — 93005 ELECTROCARDIOGRAM TRACING: CPT

## 2019-10-04 PROCEDURE — 99239 HOSP IP/OBS DSCHRG MGMT >30: CPT

## 2019-10-04 PROCEDURE — 80053 COMPREHEN METABOLIC PANEL: CPT

## 2019-10-04 PROCEDURE — 96374 THER/PROPH/DIAG INJ IV PUSH: CPT

## 2019-10-04 PROCEDURE — 86901 BLOOD TYPING SEROLOGIC RH(D): CPT

## 2019-10-04 PROCEDURE — 86923 COMPATIBILITY TEST ELECTRIC: CPT

## 2019-10-04 PROCEDURE — 86850 RBC ANTIBODY SCREEN: CPT

## 2019-10-04 RX ORDER — PANTOPRAZOLE SODIUM 20 MG/1
1 TABLET, DELAYED RELEASE ORAL
Qty: 60 | Refills: 0
Start: 2019-10-04 | End: 2019-11-02

## 2019-10-04 RX ORDER — LEVOTHYROXINE SODIUM 125 MCG
1 TABLET ORAL
Qty: 30 | Refills: 0
Start: 2019-10-04 | End: 2019-11-02

## 2019-10-04 RX ORDER — PANTOPRAZOLE SODIUM 20 MG/1
1 TABLET, DELAYED RELEASE ORAL
Qty: 0 | Refills: 0 | DISCHARGE

## 2019-10-04 RX ORDER — POTASSIUM CHLORIDE 20 MEQ
1 PACKET (EA) ORAL
Qty: 0 | Refills: 0 | DISCHARGE

## 2019-10-04 RX ORDER — LEVOTHYROXINE SODIUM 125 MCG
1 TABLET ORAL
Qty: 0 | Refills: 0 | DISCHARGE

## 2019-10-04 RX ADMIN — Medication 15 MILLILITER(S): at 12:38

## 2019-10-04 RX ADMIN — PANTOPRAZOLE SODIUM 10 MG/HR: 20 TABLET, DELAYED RELEASE ORAL at 05:50

## 2019-10-04 RX ADMIN — Medication 50 MICROGRAM(S): at 00:24

## 2019-10-04 NOTE — DISCHARGE NOTE NURSING/CASE MANAGEMENT/SOCIAL WORK - PATIENT PORTAL LINK FT
You can access the FollowMyHealth Patient Portal offered by [facility] by registering at the following website: [email or web address]. By joining [employer]’s FollowMyHealth portal, you will also be able to view your health information using other applications (apps) compatible with our system.

## 2019-10-04 NOTE — DISCHARGE NOTE PROVIDER - HOSPITAL COURSE
89 y.o. F from [facility] with hx of depression, HLD, hypothyroidism, anemia, severe dementia, prior coffee ground emesis 2/2 UGIB on 1/2018 with imaging at that time showing a large intra-thoracic stomach within a hiatal hernia; pt was transfused 2 u prbc at that time and no GI intervention was desired by the family. Currently the pt presents with recurrent coffee ground emesis, noted ct abd/pelvis imaging with nearly intrathoracic stomach suspected ischemic segment of the stomach from either volvulus or non- reduceable gastric hernia, with vascular compromise. GI consulted for possible EGD, but after extensive d/w HCP Mabel (daughter) opted for no further aggressive measures/ procedures and to c/w supportive care with fluids/ blood products. She wants comfort care/ palliative care involvement.          > acute UGIB / likely upper / Acute blood loss anemia / s/p 2 units prbcs     -h/h stable. c/w PPI     -family does not want any family procedures         >Severe dementia with behavioral disturbances    - c/w donepezil po qhs     - c/w supportive care        >Depression     - c/w mirtazapine po qhs    - c/w celexa po qd         >HTN    - hold lasix     -monitor        >Hypothyroid    - c/w synthroid         Vital Signs Last 24 Hrs    T(C): 36.5 (04 Oct 2019 08:33), Max: 36.8 (04 Oct 2019 00:03)    T(F): 97.7 (04 Oct 2019 08:33), Max: 98.2 (04 Oct 2019 00:03)    HR: 58 (04 Oct 2019 08:33) (55 - 58)    BP: 102/52 (04 Oct 2019 08:33) (102/52 - 112/66)    BP(mean): --    RR: 18 (04 Oct 2019 08:33) (16 - 18)    SpO2: 95% (04 Oct 2019 08:33) (92% - 95%)        Physical Exam:    Gen: awake, calm and cooperative, pleasantly confused     HEENT: mm mildly dry, no lesions or erythema     lungs : clear b/l, no wheezing, rhonchi or rales     cvs : s1s2, RRR    abd: +BS, Soft , nontender, non distended    ext : 2+ peripheral pulses. no edema     neuro: confused, awake, alert x 1            Discharge stable to facility. Discharge planning time 35 minutes 89 y.o. F from [facility] with hx of depression, HLD, hypothyroidism, anemia, severe dementia, prior coffee ground emesis 2/2 UGIB on 1/2018 with imaging at that time showing a large intra-thoracic stomach within a hiatal hernia; pt was transfused 2 u prbc at that time and no GI intervention was desired by the family. Currently the pt presents with recurrent coffee ground emesis, noted ct abd/pelvis imaging with nearly intrathoracic stomach suspected ischemic segment of the stomach from either volvulus or non- reduceable gastric hernia, with vascular compromise. GI consulted for possible EGD, but after extensive d/w HCP Mabel (daughter) opted for no further aggressive measures/ procedures and to c/w supportive care with fluids/ blood products. She wants comfort care/ palliative care involvement.          > acute UGIB / likely upper / Acute blood loss anemia / s/p 2 units prbcs     -h/h stable. c/w PPI     -family does not want any family procedures         >Severe dementia with behavioral disturbances    - c/w donepezil po qhs     - c/w supportive care        >Depression     - c/w mirtazapine po qhs    - c/w celexa po qd         >HTN    - hold lasix     -monitor        >Hypothyroid    - c/w synthroid         Vital Signs Last 24 Hrs    T(C): 36.5 (04 Oct 2019 08:33), Max: 36.8 (04 Oct 2019 00:03)    T(F): 97.7 (04 Oct 2019 08:33), Max: 98.2 (04 Oct 2019 00:03)    HR: 58 (04 Oct 2019 08:33) (55 - 58)    BP: 102/52 (04 Oct 2019 08:33) (102/52 - 112/66)    BP(mean): --    RR: 18 (04 Oct 2019 08:33) (16 - 18)    SpO2: 95% (04 Oct 2019 08:33) (92% - 95%)        Physical Exam:    Gen: awake, calm and cooperative, pleasantly confused     HEENT: mm mildly dry, no lesions or erythema     lungs : clear b/l, no wheezing, rhonchi or rales     cvs : s1s2, RRR    abd: +BS, Soft , nontender, non distended    ext : 2+ peripheral pulses. no edema     neuro: confused, awake, alert x 1            Discharge stable to NH facility. Discharge planning time 35 minutes 89 y.o. F from [facility] with hx of depression, HLD, hypothyroidism, anemia, severe dementia, prior coffee ground emesis 2/2 UGIB on 1/2018 with imaging at that time showing a large intra-thoracic stomach within a hiatal hernia; pt was transfused 2 u prbc at that time and no GI intervention was desired by the family. Currently the pt presents with recurrent coffee ground emesis, noted ct abd/pelvis imaging with nearly intrathoracic stomach suspected ischemic segment of the stomach from either volvulus or non- reduceable gastric hernia, with vascular compromise. GI consulted for possible EGD, but after extensive d/w HCP Mabel (daughter) opted for no further aggressive measures/ procedures and to c/w supportive care with fluids/ blood products. She wants comfort care/ palliative care involvement.          > acute UGIB / likely upper / Acute blood loss anemia / s/p 2 units prbcs     -h/h stable. c/w PPI     -family does not want any procedures         >Severe dementia with behavioral disturbances    - c/w donepezil po qhs     - c/w supportive care        >Depression     - c/w mirtazapine po qhs    - c/w celexa po qd         >HTN    - hold lasix     -monitor        >Hypothyroid    - c/w synthroid         Vital Signs     T(C): 36.5 (04 Oct 2019 08:33), Max: 36.8 (04 Oct 2019 00:03)    T(F): 97.7 (04 Oct 2019 08:33), Max: 98.2 (04 Oct 2019 00:03)    HR: 58 (04 Oct 2019 08:33) (55 - 58)    BP: 102/52 (04 Oct 2019 08:33) (102/52 - 112/66)    RR: 18 (04 Oct 2019 08:33) (16 - 18)    SpO2: 95% (04 Oct 2019 08:33) (92% - 95%)        Physical Exam:    Gen: awake, calm and cooperative, pleasantly confused     HEENT: mm mildly dry, no lesions or erythema     lungs : clear b/l, no wheezing, rhonchi or rales     cvs : s1s2, RRR    abd: +BS, Soft , nontender, non distended    ext : 2+ peripheral pulses. no edema     neuro: confused, awake, alert x 1            Discharge stable to NH facility. Discharge planning time 35 minutes 89 y.o. F from [facility] with hx of depression, HLD, hypothyroidism, anemia, severe dementia, prior coffee ground emesis 2/2 UGIB on 1/2018 with imaging at that time showing a large intra-thoracic stomach within a hiatal hernia; pt was transfused 2 u prbc at that time and no GI intervention was desired by the family. Currently the pt presents with recurrent coffee ground emesis, noted ct abd/pelvis imaging with nearly intrathoracic stomach suspected ischemic segment of the stomach from either volvulus or non- reduceable gastric hernia, with vascular compromise. GI consulted for possible EGD, but after extensive d/w HCP Mabel (daughter) opted for no further aggressive measures/ procedures and to c/w supportive care with fluids/ blood products. She wants comfort care/ palliative care involvement.     Patient is requiring oxygen 2L via NC to maintain SpO2 > 92.          > acute UGIB / likely upper / Acute blood loss anemia / s/p 2 units prbcs     -h/h stable. c/w PPI     -family does not want any procedures         >Severe dementia with behavioral disturbances    - c/w donepezil po qhs     - c/w supportive care        >Depression     - c/w mirtazapine po qhs    - c/w celexa po qd         >HTN    - hold lasix     -monitor        >Hypothyroid    - c/w synthroid         Vital Signs     T(C): 36.5 (04 Oct 2019 08:33), Max: 36.8 (04 Oct 2019 00:03)    T(F): 97.7 (04 Oct 2019 08:33), Max: 98.2 (04 Oct 2019 00:03)    HR: 58 (04 Oct 2019 08:33) (55 - 58)    BP: 102/52 (04 Oct 2019 08:33) (102/52 - 112/66)    RR: 18 (04 Oct 2019 08:33) (16 - 18)    SpO2: 95% (04 Oct 2019 08:33) (92% - 95%)        Physical Exam:    Gen: awake, calm and cooperative, pleasantly confused     HEENT: mm mildly dry, no lesions or erythema     lungs : clear b/l, no wheezing, rhonchi or rales     cvs : s1s2, RRR    abd: +BS, Soft , nontender, non distended    ext : 2+ peripheral pulses. no edema     neuro: confused, awake, alert x 1            Discharge stable to NH facility. Discharge planning time 35 minutes

## 2019-10-04 NOTE — DISCHARGE NOTE PROVIDER - NSDCFUADDAPPT_GEN_ALL_CORE_FT
Please continue comfort/ palliative/ hospice discussions outpatient. Please follow with Palliative / Hospice Teams for Comfort Care at SNF. Please follow with Palliative / Hospice Teams for Comfort Care at SNF.  Continue oxygen 2L via NC.

## 2019-10-04 NOTE — DISCHARGE NOTE PROVIDER - NSDCCPCAREPLAN_GEN_ALL_CORE_FT
PRINCIPAL DISCHARGE DIAGNOSIS  Diagnosis: Upper GI bleed  Assessment and Plan of Treatment: H/H stable, no intervention as per family. no active bleed      SECONDARY DISCHARGE DIAGNOSES  Diagnosis: Hypothyroid  Assessment and Plan of Treatment: continue supplements. montior with PMD    Diagnosis: Dementia  Assessment and Plan of Treatment: stable, supportive care

## 2020-09-17 NOTE — ED PROVIDER NOTE - CCCP TRG CHIEF CMPLNT
GENETIC COUNSELING-Neurology  Spoke with patient to say that genetic testing was received in the lab and we are scheduled for a result visit with myself and Dr. Hatch on 10/2/2020.    Boby Torres MS, [facility]  Certified Genetic Counselor   vomiting blood

## 2022-05-06 NOTE — ED PROVIDER NOTE - CPE EDP EYES NORM
normal... Azithromycin Counseling:  I discussed with the patient the risks of azithromycin including but not limited to GI upset, allergic reaction, drug rash, diarrhea, and yeast infections.

## 2022-06-20 NOTE — PATIENT PROFILE ADULT - NSASFALLNEEDSASSIST_GEN_A_NUR
yes Methotrexate Pregnancy And Lactation Text: This medication is Pregnancy Category X and is known to cause fetal harm. This medication is excreted in breast milk.
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
## 2022-11-01 NOTE — ED ADULT TRIAGE NOTE - CHIEF COMPLAINT QUOTE
How Severe Is Your Skin Lesion?: moderate Pt has been vomiting BRB since 6PM. Pt denies any abdominal pain. Has Your Skin Lesion Been Treated?: not been treated Is This A New Presentation, Or A Follow-Up?: Skin Lesions

## 2022-12-14 NOTE — ED ADULT NURSE NOTE - NS ED NURSE RECORD ANOTHER HT AND WT
Yes Tremfya Counseling: I discussed with the patient the risks of guselkumab including but not limited to immunosuppression, serious infections, and drug reactions.  The patient understands that monitoring is required including a PPD at baseline and must alert us or the primary physician if symptoms of infection or other concerning signs are noted.

## 2022-12-23 NOTE — INPATIENT CERTIFICATION FOR MEDICARE PATIENTS - IN ORDER TO MEET MEDICARE REQUIREMENTS.
Received call from patient requesting refill(s) of oxyCODONE IR (ROXICODONE) 30 MG    Last dispensed from pharmacy on 12.09.2022    Patient's last office/virtual visit by prescribing provider on 12.14.2022    Next office/virtual appointment scheduled for NS    UDT/CSA 12.14.2021    E-prescribe to    [id number] - [place], [place] - [address] [address] [place]      Will route to nursing [place] for review and preparation of prescription(s).        In order to meet Medicare requirements, the clinical documentation must support the information cited in the admission order.  Please be sure to provide detailed and clear documentation about the following in the admitting note/history and physical:

## 2023-02-07 NOTE — H&P ADULT - HISTORY OF PRESENT ILLNESS
Patient presents today for evaluation of intermittent epigastric pain that has been occuring for the last 6 months.  Pain is aggravated by eating and constipation. Has tried and failed use of Miralax, stool softeners and gylcerin suppositories.  Associated symptoms:  gas/bloating and buring episgastric region.  Defecation only occurs with use Miralax.  Taking daily leads to defecation every 2-3 days.  Stools range from types 2-3 on stool scale.  Voices straining with defecation and fails to achieve a complete sense of evacuation.  Epigastric pain improves or resolves with defecation until the cycle resumes.  Remains on omeprazole daily.    EGD and colonoscopy were performed on 06/2021.  EGD findings:  esophagitis (neg Eddy's), small hiatal hernia, gastritis (neg HP), normal duodenum.   Colonoscopy was normal. Pt with dementia, unable to provide meaningful history, history obtained from Pt's son Mr Rashad Srivastava at bedside and also from EMR /NH charts. Briefly she is 89 y/o female with PMhx of Chronic anemia, HLD, depression, psychosis, senile dementia, hypothyroidism, NH resident, was sent to ED from NH for possible syncopal episode this morning. Pt son unable to provide more details of events. Pt is awake she says I am always fine and has no complaints, unable to obtain full review of system due to dementia.    Off note, per patient son she has no chronic medical problems except dementia but as per NH charts she has history of above chronic medical problems. Son wants no further intervention for her anemia- says give her blood transfusion , stabilize the hemoglobin and sent her back to NH- son refusing for any GI intervention. Pt DNI /DNR per son- MOLST form filled.

## 2023-02-27 NOTE — ED PROVIDER NOTE - NSCAREINITIATED _GEN_ER
Re: Refills of Oral Specialty Medication - Verzenio (abemaciclib)    • Drug-Drug Interactions: The current medication list was reviewed and there are no relevant drug-drug interactions.  • Medication Allergies: The patient has no relevant allergies as it relates to their oral specialty medication  • Review of Labs/Dose Adjustments: NO DOSE CHANGE - I reviewed the most recent note and labs and the patient will continue without any dose changes.  I sent refills as described below.    Drug: Verzenio (abemaciclib)  Strength: 100 mg  Directions: Take 1 tablet by mouth twice a day  Quantity: 56  Refills: 5  Pharmacy prescription sent to: [facility]      Radha Arreola PharmD, [facility]  Oncology Clinical Pharmacist  2/27/2023  10:01 EST              Cash Zavala(Attending)

## 2023-04-01 NOTE — ED ADULT TRIAGE NOTE - PAIN: PRESENCE, MLM
denies pain/discomfort moderate to severe MR as above.  --Structural heart Dr. Sousa to be consulted.

## 2024-05-14 NOTE — ED ADULT NURSE NOTE - NSHISCREENINGQ1_ED_A_ED
From: Vasiliy Adams  To: Juan Pablo Palmer  Sent: 5/10/2024 12:04 PM CDT  Subject: Insight on test results    Hello,    I’ve seen a lot of the test results being posted. Hoping Dr. Palmer may be able to provide some feedback, guidance, etc. on the results we have seen so far. Thanks.    No

## 2025-01-10 NOTE — ED ADULT TRIAGE NOTE - TEMPERATURE IN FAHRENHEIT (DEGREES F)
Social Work- Spoke with wife. She is requesting a referral to Melissa Russo, and Myriam Riggins. Sent referral. Noa   98.6